# Patient Record
Sex: FEMALE | Race: WHITE | Employment: FULL TIME | URBAN - METROPOLITAN AREA
[De-identification: names, ages, dates, MRNs, and addresses within clinical notes are randomized per-mention and may not be internally consistent; named-entity substitution may affect disease eponyms.]

---

## 2024-01-22 ENCOUNTER — OFFICE VISIT (OUTPATIENT)
Dept: OBGYN CLINIC | Facility: CLINIC | Age: 34
End: 2024-01-22
Payer: COMMERCIAL

## 2024-01-22 ENCOUNTER — LAB ENCOUNTER (OUTPATIENT)
Dept: LAB | Age: 34
End: 2024-01-22
Payer: COMMERCIAL

## 2024-01-22 VITALS
BODY MASS INDEX: 29.41 KG/M2 | HEIGHT: 66 IN | DIASTOLIC BLOOD PRESSURE: 84 MMHG | HEART RATE: 71 BPM | SYSTOLIC BLOOD PRESSURE: 120 MMHG | WEIGHT: 183 LBS

## 2024-01-22 DIAGNOSIS — N92.6 IRREGULAR BLEEDING: ICD-10-CM

## 2024-01-22 DIAGNOSIS — Z31.41 FERTILITY TESTING: ICD-10-CM

## 2024-01-22 DIAGNOSIS — Z01.419 WELL WOMAN EXAM WITH ROUTINE GYNECOLOGICAL EXAM: ICD-10-CM

## 2024-01-22 DIAGNOSIS — Z01.419 WELL WOMAN EXAM WITH ROUTINE GYNECOLOGICAL EXAM: Primary | ICD-10-CM

## 2024-01-22 LAB
FSH SERPL-ACNC: 3.6 MIU/ML
LH SERPL-ACNC: 2.9 MIU/ML
PROLACTIN SERPL-MCNC: 17.9 NG/ML
TSI SER-ACNC: 3.15 MIU/ML (ref 0.36–3.74)

## 2024-01-22 PROCEDURE — 84443 ASSAY THYROID STIM HORMONE: CPT

## 2024-01-22 PROCEDURE — 87624 HPV HI-RISK TYP POOLED RSLT: CPT

## 2024-01-22 PROCEDURE — 84146 ASSAY OF PROLACTIN: CPT

## 2024-01-22 PROCEDURE — 83002 ASSAY OF GONADOTROPIN (LH): CPT

## 2024-01-22 PROCEDURE — 83001 ASSAY OF GONADOTROPIN (FSH): CPT

## 2024-01-22 PROCEDURE — 88175 CYTOPATH C/V AUTO FLUID REDO: CPT

## 2024-01-22 PROCEDURE — 83520 IMMUNOASSAY QUANT NOS NONAB: CPT

## 2024-01-22 RX ORDER — VENLAFAXINE HYDROCHLORIDE 37.5 MG/1
37.5 CAPSULE, EXTENDED RELEASE ORAL
COMMUNITY
Start: 2023-12-26

## 2024-01-22 NOTE — PROGRESS NOTES
Cady Garcia is a 33 year old female  Patient's last menstrual period was 2023 (exact date).   Chief Complaint   Patient presents with    New Patient    Wellness Visit     Needs pap smear    Consult     Preconception, has been trying to conceive for 6mo and not getting pregnant.  had semen analysis 10yrs ago, it was WNL but slow swimmers. Has been taking a conception for men supplement    .    OBSTETRICS HISTORY:  OB History    Para Term  AB Living   0 0 0 0 0 0   SAB IAB Ectopic Multiple Live Births   0 0 0 0 0       GYNE HISTORY:  Periods regular monthly  her cycle is every 28-29 days, bleeds for 6 days.   History   Sexual Activity    Sexual activity: Yes    Partners: Male     Comment: trying to conceive        Hx Prior Abnormal Pap: No  Pap Date:  (3-4yrs)  Pap Result Notes: WNL per patient        MEDICAL HISTORY:  History reviewed. No pertinent past medical history.    SURGICAL HISTORY:  Past Surgical History:   Procedure Laterality Date    Gastric bypass,obese<100cm nicole-en-y         SOCIAL HISTORY:  Social History     Socioeconomic History    Marital status:      Spouse name: Not on file    Number of children: Not on file    Years of education: Not on file    Highest education level: Not on file   Occupational History    Not on file   Tobacco Use    Smoking status: Never    Smokeless tobacco: Never   Vaping Use    Vaping Use: Never used   Substance and Sexual Activity    Alcohol use: Yes     Comment: rarely    Drug use: Never    Sexual activity: Yes     Partners: Male     Comment: trying to conceive   Other Topics Concern    Not on file   Social History Narrative    Not on file     Social Determinants of Health     Financial Resource Strain: Not on file   Food Insecurity: Not on file   Transportation Needs: Not on file   Physical Activity: Not on file   Stress: Not on file   Social Connections: Not on file   Housing Stability: Not on file       FAMILY  HISTORY:  Family History   Problem Relation Age of Onset    Kidney Disease Father         kidney failure    Hypertension Mother        MEDICATIONS:    Current Outpatient Medications:     venlafaxine ER 37.5 MG Oral Capsule SR 24 Hr, Take 1 capsule (37.5 mg total) by mouth daily with food., Disp: , Rfl:     ALLERGIES:  No Known Allergies      Review of Systems:  Constitutional:  Denies fatigue, night sweats, hot flashes  Eyes:  denies blurred or double vision  Cardiovascular:  denies chest pain or palpitations  Respiratory:  denies shortness of breath  Gastrointestinal:  denies heartburn, abdominal pain, diarrhea or constipation  Genitourinary:  denies dysuria, incontinence, abnormal vaginal discharge, vaginal itching  Musculoskeletal:  denies back pain.  Skin/Breast:  Denies any breast pain, lumps, or discharge.   Neurological:  denies headaches, extremity weakness or numbness.  Psychiatric: denies depression or anxiety.  Endocrine:   denies excessive thirst or urination.  Heme/Lymph:  denies history of anemia, easy bruising or bleeding.      PHYSICAL EXAM:   Constitutional: well developed, well nourished  Head/Face: normocephalic  Neck/Thyroid: thyroid symmetric, no thyromegaly, no nodules, no adenopathy  Lymphatic:no abnormal supraclavicular or axillary adenopathy is noted  Breast: normal without palpable masses, tenderness, asymmetry, nipple discharge, nipple retraction or skin changes  Abdomen:  soft, nontender, nondistended, no masses  Skin/Hair: no unusual rashes or bruises  Extremities: no edema, no cyanosis  Psychiatric:  Oriented to time, place, person and situation. Appropriate mood and affect    Pelvic Exam:  External Genitalia: normal appearance, hair distribution, and no lesions  Urethral Meatus:  normal in size, location, without lesions and prolapse  Bladder:  No fullness, masses or tenderness  Vagina:  Normal appearance without lesions, no abnormal discharge  Cervix:  Normal without tenderness on  motion, cervical polyp noted   Uterus: normal in size, contour, position, mobility, without tenderness  Adnexa: normal without masses or tenderness  Perineum: normal  Anus: no hemorroids     Assessment & Plan:  Diagnoses and all orders for this visit:    Well woman exam with routine gynecological exam  -     Hpv High Risk , Thin Prep Collect; Future  -     ThinPrep PAP Smear B; Future    Fertility testing  -     FSH; Future  -     LH (Luteinizing Hormone); Future  -     TSH W REFLEX TO FREE T4 [60246][Q]; Future  -     Anti-Mullerian Hormone; Future  -     Prolactin; Future  -     XR HYSTEROSALPINGOGRAM (CPT=74740/42832); Future    Irregular bleeding    Discussed w/pt cervical polyp causing her irregular spotting - advise to see OBs for removal.

## 2024-01-23 ENCOUNTER — PATIENT MESSAGE (OUTPATIENT)
Dept: OBGYN CLINIC | Facility: CLINIC | Age: 34
End: 2024-01-23

## 2024-01-23 DIAGNOSIS — Z01.812 PRE-PROCEDURAL LABORATORY EXAMINATION: Primary | ICD-10-CM

## 2024-01-23 LAB — HPV I/H RISK 1 DNA SPEC QL NAA+PROBE: NEGATIVE

## 2024-01-25 NOTE — TELEPHONE ENCOUNTER
From: Cady Garcia  To: Sarita Krishnan  Sent: 1/23/2024 12:52 PM CST  Subject: Test results     Do I need to make an appointment with the fertility doctor that you gave me? Or should I wait for results of my HSG and my ’s semen analysis?

## 2024-01-25 NOTE — TELEPHONE ENCOUNTER
Pt calling to speak to the nurse about this. Pt states she is getting confused with all the [facility] messages.

## 2024-01-26 LAB — MULLERIAN AMH: 3.4 NG/ML

## 2024-01-29 LAB
.: NORMAL
.: NORMAL

## 2024-01-31 ENCOUNTER — HOSPITAL ENCOUNTER (OUTPATIENT)
Dept: GENERAL RADIOLOGY | Facility: HOSPITAL | Age: 34
Discharge: HOME | End: 2024-01-31
Payer: COMMERCIAL

## 2024-01-31 DIAGNOSIS — Z31.41 FERTILITY TESTING: ICD-10-CM

## 2024-01-31 PROCEDURE — 58340 CATHETER FOR HYSTEROGRAPHY: CPT

## 2024-01-31 PROCEDURE — 74740 X-RAY FEMALE GENITAL TRACT: CPT

## 2024-02-01 ENCOUNTER — OFFICE VISIT (OUTPATIENT)
Facility: CLINIC | Age: 34
End: 2024-02-01
Payer: COMMERCIAL

## 2024-02-01 VITALS
HEIGHT: 66 IN | SYSTOLIC BLOOD PRESSURE: 118 MMHG | BODY MASS INDEX: 29.41 KG/M2 | DIASTOLIC BLOOD PRESSURE: 80 MMHG | HEART RATE: 70 BPM | WEIGHT: 183 LBS

## 2024-02-01 DIAGNOSIS — Z11.3 SCREENING EXAMINATION FOR STI: Primary | ICD-10-CM

## 2024-02-01 PROCEDURE — 3074F SYST BP LT 130 MM HG: CPT

## 2024-02-01 PROCEDURE — 99212 OFFICE O/P EST SF 10 MIN: CPT

## 2024-02-01 PROCEDURE — 3079F DIAST BP 80-89 MM HG: CPT

## 2024-02-01 PROCEDURE — 87591 N.GONORRHOEAE DNA AMP PROB: CPT

## 2024-02-01 PROCEDURE — 3008F BODY MASS INDEX DOCD: CPT

## 2024-02-01 PROCEDURE — 87491 CHLMYD TRACH DNA AMP PROBE: CPT

## 2024-02-01 RX ORDER — MULTIVITAMIN
1 TABLET ORAL DAILY
COMMUNITY

## 2024-02-01 NOTE — PROGRESS NOTES
Cady Garcia is a 33 year old female  Patient's last menstrual period was 2024 (exact date).   Chief Complaint   Patient presents with    Gyn Problem   .  She is here for a gonorrhea/chlamydia cervical culture, plans of meeting with reproductive endocrinologist.     OBSTETRICS HISTORY:  OB History    Para Term  AB Living   0 0 0 0 0 0   SAB IAB Ectopic Multiple Live Births   0 0 0 0 0       GYNE HISTORY:      History   Sexual Activity    Sexual activity: Yes    Partners: Male     Comment: trying to conceive        Pap Date: 24  Pap Result Notes: neg/neg        MEDICAL HISTORY:  History reviewed. No pertinent past medical history.    SURGICAL HISTORY:  Past Surgical History:   Procedure Laterality Date    Gastric bypass,obese<100cm nicole-en-y         SOCIAL HISTORY:  Social History     Socioeconomic History    Marital status:      Spouse name: Not on file    Number of children: Not on file    Years of education: Not on file    Highest education level: Not on file   Occupational History    Not on file   Tobacco Use    Smoking status: Never    Smokeless tobacco: Never   Vaping Use    Vaping Use: Never used   Substance and Sexual Activity    Alcohol use: Not Currently     Comment: rarely    Drug use: Never    Sexual activity: Yes     Partners: Male     Comment: trying to conceive   Other Topics Concern    Not on file   Social History Narrative    ** Merged History Encounter **          Social Determinants of Health     Financial Resource Strain: Not on file   Food Insecurity: Not on file   Transportation Needs: Not on file   Physical Activity: Not on file   Stress: Not on file   Social Connections: Not on file   Housing Stability: Not on file       FAMILY HISTORY:  Family History   Problem Relation Age of Onset    Kidney Disease Father         kidney failure    Hypertension Mother        MEDICATIONS:    Current Outpatient Medications:     Multiple Vitamin (ONE-DAILY  MULTI VITAMINS) Oral Tab, Take 1 tablet by mouth daily., Disp: , Rfl:     venlafaxine ER 37.5 MG Oral Capsule SR 24 Hr, Take 1 capsule (37.5 mg total) by mouth daily with food., Disp: , Rfl:     ALLERGIES:    Allergies   Allergen Reactions    Amoxicillin HIVES    Sulfa Antibiotics HIVES         PHYSICAL EXAM:   Pelvic Exam:  External Genitalia: normal appearance, hair distribution, and no lesions  Urethral Meatus:  normal in size, location, without lesions and prolapse  Bladder:  No fullness, masses or tenderness  Vagina:  Normal appearance without lesions, no abnormal discharge  Cervix:  Normal without tenderness on motion, cervical polyp seen   Perineum: normal  Anus: no hemorroids     Assessment & Plan:  1. Screening examination for STI    - Chlamydia/Gc Amplification; Future

## 2024-02-02 LAB
C TRACH DNA SPEC QL NAA+PROBE: NEGATIVE
N GONORRHOEA DNA SPEC QL NAA+PROBE: NEGATIVE

## 2024-02-05 ENCOUNTER — TELEPHONE (OUTPATIENT)
Facility: CLINIC | Age: 34
End: 2024-02-05

## 2024-02-05 DIAGNOSIS — N97.9 FEMALE INFERTILITY: Primary | ICD-10-CM

## 2024-02-05 NOTE — TELEPHONE ENCOUNTER
1.Pt looking for her partner's semen analysis report:Grey Garcia [birth year]. Will call [facility] for results.     2. Once received with send to provider for review    3. And we can place AYESHA request    Patient verbalized understanding, agreed to and intend to comply with plan of care.

## 2024-02-05 NOTE — TELEPHONE ENCOUNTER
Patient is calling for  her husbands semen analysis report. Her and her  can she them in my chart but have some questions

## 2024-02-06 NOTE — TELEPHONE ENCOUNTER
AYESHA referral initiated, awaiting determination, pt aware, verb. Understanding.   Referral Information    Referral # Creation Date Referral Status Status Update    [id number] 02/06/2024 Open 02/06/2024: Status History     Status Reason Referral Type Referral Reasons Referral Class   none OFFICE VISIT none Internal     To Specialty To Provider To Location/Place of Service To Department   INFERTILITY REPRODUCTIVE ENDO Bear Kay MD none none

## 2024-02-06 NOTE — PROGRESS NOTES
Partner's Semen Analysis result:  Name:Grey Garcia   : 3/23/1985    Normal spermatozoa 11%  Spermatozoa with%:      Head defects 76%      Mid piece defects 13%      Tail defects 0%

## 2024-02-08 ENCOUNTER — TELEPHONE (OUTPATIENT)
Dept: ADMINISTRATIVE | Age: 34
End: 2024-02-08

## 2024-02-08 ENCOUNTER — TELEPHONE (OUTPATIENT)
Facility: CLINIC | Age: 34
End: 2024-02-08

## 2024-02-13 ENCOUNTER — OFFICE VISIT (OUTPATIENT)
Dept: FAMILY MEDICINE CLINIC | Facility: CLINIC | Age: 34
End: 2024-02-13
Payer: COMMERCIAL

## 2024-02-13 VITALS
HEIGHT: 66 IN | RESPIRATION RATE: 16 BRPM | BODY MASS INDEX: 29.57 KG/M2 | TEMPERATURE: 98 F | WEIGHT: 184 LBS | HEART RATE: 91 BPM | SYSTOLIC BLOOD PRESSURE: 104 MMHG | OXYGEN SATURATION: 97 % | DIASTOLIC BLOOD PRESSURE: 64 MMHG

## 2024-02-13 DIAGNOSIS — F41.1 GAD (GENERALIZED ANXIETY DISORDER): ICD-10-CM

## 2024-02-13 DIAGNOSIS — Z83.3 FAMILY HISTORY OF DIABETES MELLITUS: ICD-10-CM

## 2024-02-13 DIAGNOSIS — Z00.00 WELL ADULT EXAM: Primary | ICD-10-CM

## 2024-02-13 DIAGNOSIS — E04.2 MULTIPLE THYROID NODULES: ICD-10-CM

## 2024-02-13 DIAGNOSIS — E78.00 PURE HYPERCHOLESTEROLEMIA: ICD-10-CM

## 2024-02-13 DIAGNOSIS — Z91.09 ENVIRONMENTAL ALLERGIES: ICD-10-CM

## 2024-02-13 PROBLEM — E03.9 HYPOTHYROIDISM: Status: ACTIVE | Noted: 2024-02-13

## 2024-02-13 PROCEDURE — 3008F BODY MASS INDEX DOCD: CPT | Performed by: FAMILY MEDICINE

## 2024-02-13 PROCEDURE — 3074F SYST BP LT 130 MM HG: CPT | Performed by: FAMILY MEDICINE

## 2024-02-13 PROCEDURE — 3078F DIAST BP <80 MM HG: CPT | Performed by: FAMILY MEDICINE

## 2024-02-13 PROCEDURE — 99385 PREV VISIT NEW AGE 18-39: CPT | Performed by: FAMILY MEDICINE

## 2024-02-13 PROCEDURE — G0438 PPPS, INITIAL VISIT: HCPCS | Performed by: FAMILY MEDICINE

## 2024-02-13 RX ORDER — VENLAFAXINE HYDROCHLORIDE 37.5 MG/1
37.5 CAPSULE, EXTENDED RELEASE ORAL
Qty: 90 CAPSULE | Refills: 3 | Status: SHIPPED | OUTPATIENT
Start: 2024-02-13

## 2024-02-13 RX ORDER — ALPRAZOLAM 0.25 MG/1
0.25 TABLET ORAL NIGHTLY PRN
COMMUNITY

## 2024-02-13 RX ORDER — MONTELUKAST SODIUM 10 MG/1
10 TABLET ORAL DAILY
Qty: 90 TABLET | Refills: 3 | Status: SHIPPED | OUTPATIENT
Start: 2024-02-13 | End: 2025-02-07

## 2024-02-13 NOTE — PROGRESS NOTES
Note to patient: The 21st Century Cures Act makes medical notes like these available to patients in the interest of transparency. However, be advised this is a medical document. It is intended as peer to peer communication. It is written in medical language and may contain abbreviations or verbiage that are unfamiliar. It may appear blunt or direct. Medical documents are intended to carry relevant information, facts as evident, and the clinical opinion of the practitioner.         Chief Complaint   Patient presents with    Physical       HPI:  New patient presents for annual physical. Her past medical history includes anxiety, hyperlipidemia, hypothyroidism, and allergic rhinitis. Current medications include Venlafaxine (requesting refill) and an OTC allergy medication. She states for the last 5 months the roof of her mouth and posterior pharynx has been intermittently itching throughout the day.    Surgical history includes gastric bypass in 2022 and breat augmentation b/l in 2017.  Patient reports her and her  have been thinking about having kids soon.     Patient reports increase in allergies since moving to [place]. She reports that she has postnasal drip/itchy pharynx and sinus headaches. She is taking otc anti allergy medication.     PMHx: Anxiety, hyperlipidemia, hypothyroidism, allergic rhinitis   Multiple nodules on neck ultrasound, benign, has appointment in July   PSHx: Gastric bypass 2022- sh eis on MV, breast augmentation b/l 2017  FMHx:  -maternal: Mother- hypertension, hyperlipidemia   -paternal: Father - kidney disease, hypotension, prostate cancer; passed away from kidney failure failure 2 years   Smoking: none  Alcohol: occasionally   Drugs: none   Sexual hx: 1 partner   STD hx: declined  Occupation: Xray technologist   Mammogram: Never   Colonoscopy: Never   Maternal uncle with colon cancer  Pap smear: Sees OB/GYN, had pap smear done 2/1/24- no hx of abnormal pap smears  Tdap: 2015   Diet:  Reports well diet,  is a - eats home cooked meals   Exercise: Needs improvement, states activity is better in the summer     Wt Readings from Last 6 Encounters:   02/13/24 184 lb   02/01/24 183 lb   01/22/24 182 lb 15.7 oz   01/31/22 210 lb   09/02/21 209 lb     Review of Systems   Constitutional: Negative for fever, chills and fatigue. No distress.  HENT: Negative for hearing loss, congestion, sore throat, neck pain +post nasal drip +roof of mouth itching   Eyes: Negative for pain and visual disturbance.   Respiratory: Negative for  chest tightness, shortness of breath and wheezing.    Cardiovascular: Negative for chest pain, palpitations and leg swelling.   Gastrointestinal: Negative for nausea, vomiting, abdominal pain, diarrhea, blood in stool and abdominal distention.   Genitourinary: Negative for dysuria, hematuria and difficulty urinating.   Musculoskeletal: Negative for myalgias, back pain, joint swelling, arthralgias and gait problem.   Skin: Negative for color change and rash.   Neurological: Negative for dizziness, syncope, weakness, numbness, tingling and headaches.   Hematological: Negative for adenopathy. Does not bruise/bleed easily.   Psychiatric/Behavioral: The patient is not nervous/anxious. No depression.      Past Medical History:   Diagnosis Date    Allergic rhinitis     Anxiety 2014    taking Venlafaxine 37.5mg    Hyperlipidemia 2021    Hypothyroidism nodules on thyroid in 2023. need follow up in June 2024.    Obesity baratric surg 2022     Past Surgical History:   Procedure Laterality Date    GASTRIC BYPASS,OBESE<100CM DIANA-EN-Y  2022    OTHER SURGICAL HISTORY  gastric bypass and breast augmentation     Family History   Problem Relation Age of Onset    Kidney Disease Father         kidney failure    Cancer Father         prostate CA    Diabetes Father     Heart Disorder Father         a-fib, and pacemaker    Hypertension Mother     Lipids Mother         high cholesterol     Social  History     Socioeconomic History    Marital status:    Tobacco Use    Smoking status: Never    Smokeless tobacco: Never   Vaping Use    Vaping Use: Never used   Substance and Sexual Activity    Alcohol use: Yes     Alcohol/week: 1.0 standard drink of alcohol     Types: 1 Standard drinks or equivalent per week     Comment: rarely    Drug use: Never    Sexual activity: Yes     Partners: Male     Comment: trying to conceive   Other Topics Concern    Caffeine Concern No    Exercise No    Seat Belt No    Special Diet No    Stress Concern No    Weight Concern No   Social History Narrative    ** Merged History Encounter **            Current Outpatient Medications   Medication Sig Dispense Refill    ALPRAZolam 0.25 MG Oral Tab Take 1 tablet (0.25 mg total) by mouth nightly as needed.      Multiple Vitamin (ONE-DAILY MULTI VITAMINS) Oral Tab Take 1 tablet by mouth daily.      venlafaxine ER 37.5 MG Oral Capsule SR 24 Hr Take 1 capsule (37.5 mg total) by mouth daily with food.         Allergies  Allergies   Allergen Reactions    Amoxicillin HIVES    Sulfa Antibiotics HIVES       Health Maintenance  Immunizations:  Immunization History   Administered Date(s) Administered    Covid-19 Vaccine Pfizer 30 mcg/0.3 ml 01/21/2021, 02/10/2021, 11/11/2021    FLUZONE 6 months and older PFS 0.5 ml (69605) 09/25/2017    HEP B, Adult 03/09/1993, 05/14/1993, 10/08/1993    Influenza 02/10/2020, 10/12/2021, 10/15/2023    MMR 03/10/1994, 06/18/1998    TDAP 07/06/2015    Tb Intradermal Test 07/02/2018         Physical Exam  /64   Pulse 91   Temp 98 °F (36.7 °C) (Temporal)   Resp 16   Ht 5' 6\" (1.676 m)   Wt 184 lb   LMP 01/23/2024 (Exact Date)   SpO2 97%   BMI 29.70 kg/m²   Constitutional: Oriented to person, place, and time. No distress.   HEENT: Normocephalic and atraumatic. Hearing and tympanic membranes normal.  Nose normal. Oropharynx is clear and moist.   Eyes: Conjunctivae and EOM are normal. PERRLA. No scleral  icterus.   Neck: No mass and no thyromegaly present.   Cardiovascular: Normal rate, regular rhythm and intact distal pulses.  No murmur, rubs or gallops.   Pulmonary/Chest: Effort normal and breath sounds normal. No respiratory distress. No wheezes, rhonchi or rales  Abdominal: Soft. Bowel sounds are normal. Non tender, no masses, no organomegaly or hernias.  Musculoskeletal: No edema and no tenderness.    Neurological: grossly normal   Skin: Skin is warm and dry.  Psychiatric: Normal mood and affect.     A/P:    Encounter Diagnosis   Name Primary?    Well adult exam Yes         Meds & Refills for this Visit:  Requested Prescriptions     Pending Prescriptions Disp Refills    venlafaxine ER 37.5 MG Oral Capsule SR 24 Hr 90 capsule 3     Sig: Take 1 capsule (37.5 mg total) by mouth daily with food.       1. Well adult exam  - CBC With Differential With Platelet; Future  - Comp Metabolic Panel (14); Future  - Lipid Panel; Future    2. Family history of diabetes mellitus  - Hemoglobin A1C [E]; Future    3. CHON (generalized anxiety disorder)    4. Pure hypercholesterolemia    5. Multiple thyroid nodules  - Endocrine Referral - In Network    6. Environmental allergies  - montelukast (SINGULAIR) 10 MG Oral Tab; Take 1 tablet (10 mg total) by mouth daily.  Dispense: 90 tablet; Refill: 3    New patient presented for physical. Basic blood work and A1c was ordered due to fmhx of DM. Patient reported worsening allergies, montelukast was prescribed and advised patient to use flonase as needed. Patient was provided a referral for endocrinologist due to history of multiple benign thyroid nodules on neck ultrasound, TSH 1/2024 was WNL. No major concerns at this time. A refill for her Venlafaxine was prescribed for CHON. Patient to follow up for next annual examination or sooner if needed. Patient understands and agrees with plan.     Jojo LAWSON-S2      I have personally seen and examined the patient, I have reviewed the PA  student's examination, notes, and agree with the assessment and plan.      Lucía Chun, DO        This note was prepared using Dragon Medical voice recognition dictation software. As a result errors may occur. When identified these errors have been corrected. While every attempt is made to correct errors during dictation discrepancies may still exist.      Note to patient: The 21st Century Cures Act makes medical notes like these available to patients in the interest of transparency. However, be advised this is a medical document. It is intended as peer to peer communication. It is written in medical language and may contain abbreviations or verbiage that are unfamiliar. It may appear blunt or direct. Medical documents are intended to carry relevant information, facts as evident, and the clinical opinion of the practitioner.

## 2024-02-16 ENCOUNTER — TELEPHONE (OUTPATIENT)
Facility: CLINIC | Age: 34
End: 2024-02-16

## 2024-02-16 NOTE — TELEPHONE ENCOUNTER
Pt states her ins approved the referral and faxed it to us, we do not have the fax. Pt provided the phone number from her ins, [phone]  Also I advised the pt to call the referral dept of her ins and give them our fax number.

## 2024-02-19 NOTE — TELEPHONE ENCOUNTER
Spoke with pt. Asked if I could refax referral & records to Dr. Kay. Records faxed. Verbalized understanding.

## 2024-02-21 ENCOUNTER — PATIENT MESSAGE (OUTPATIENT)
Facility: CLINIC | Age: 34
End: 2024-02-21

## 2024-02-22 NOTE — TELEPHONE ENCOUNTER
From: Cady Garcia  To: Sarita Krishnan  Sent: 2/21/2024 4:51 PM CST  Subject: Fax results     Hello! My fertility doctor would like to see my HSG X-rays. Is there a way to send those over? They are also requesting my lab results and husbands semen analysis results so they can see numbers.     For Dr Kay’ clinic

## 2024-02-22 NOTE — TELEPHONE ENCOUNTER
Spoke with patient. She is aware to call medical records for a CD of her Hysterosalpingogram done at the hospital. She should be able to get her labs there as well. She will need to call [facility] for physical copy of spouses semen analysis. Our copy was sent to scanning. Understanding verbalized.

## 2024-03-05 ENCOUNTER — PATIENT MESSAGE (OUTPATIENT)
Dept: FAMILY MEDICINE CLINIC | Facility: CLINIC | Age: 34
End: 2024-03-05

## 2024-03-06 ENCOUNTER — LAB ENCOUNTER (OUTPATIENT)
Dept: LAB | Age: 34
End: 2024-03-06
Attending: FAMILY MEDICINE
Payer: COMMERCIAL

## 2024-03-06 DIAGNOSIS — Z83.3 FAMILY HISTORY OF DIABETES MELLITUS: ICD-10-CM

## 2024-03-06 DIAGNOSIS — Z00.00 WELL ADULT EXAM: ICD-10-CM

## 2024-03-06 LAB
ALBUMIN SERPL-MCNC: 3.8 G/DL (ref 3.4–5)
ALBUMIN/GLOB SERPL: 1.2 {RATIO} (ref 1–2)
ALP LIVER SERPL-CCNC: 73 U/L
ALT SERPL-CCNC: 44 U/L
ANION GAP SERPL CALC-SCNC: 1 MMOL/L (ref 0–18)
AST SERPL-CCNC: 22 U/L (ref 15–37)
BASOPHILS # BLD AUTO: 0.02 X10(3) UL (ref 0–0.2)
BASOPHILS NFR BLD AUTO: 0.3 %
BILIRUB SERPL-MCNC: 0.4 MG/DL (ref 0.1–2)
BUN BLD-MCNC: 13 MG/DL (ref 9–23)
CALCIUM BLD-MCNC: 8.9 MG/DL (ref 8.5–10.1)
CHLORIDE SERPL-SCNC: 105 MMOL/L (ref 98–112)
CHOLEST SERPL-MCNC: 202 MG/DL (ref ?–200)
CO2 SERPL-SCNC: 30 MMOL/L (ref 21–32)
CREAT BLD-MCNC: 0.7 MG/DL
EGFRCR SERPLBLD CKD-EPI 2021: 119 ML/MIN/1.73M2 (ref 60–?)
EOSINOPHIL # BLD AUTO: 0.03 X10(3) UL (ref 0–0.7)
EOSINOPHIL NFR BLD AUTO: 0.5 %
ERYTHROCYTE [DISTWIDTH] IN BLOOD BY AUTOMATED COUNT: 12 %
EST. AVERAGE GLUCOSE BLD GHB EST-MCNC: 108 MG/DL (ref 68–126)
FASTING PATIENT LIPID ANSWER: YES
FASTING STATUS PATIENT QL REPORTED: YES
GLOBULIN PLAS-MCNC: 3.1 G/DL (ref 2.8–4.4)
GLUCOSE BLD-MCNC: 88 MG/DL (ref 70–99)
HBA1C MFR BLD: 5.4 % (ref ?–5.7)
HCT VFR BLD AUTO: 37.3 %
HDLC SERPL-MCNC: 68 MG/DL (ref 40–59)
HGB BLD-MCNC: 12.4 G/DL
IMM GRANULOCYTES # BLD AUTO: 0.01 X10(3) UL (ref 0–1)
IMM GRANULOCYTES NFR BLD: 0.2 %
LDLC SERPL CALC-MCNC: 120 MG/DL (ref ?–100)
LYMPHOCYTES # BLD AUTO: 2.98 X10(3) UL (ref 1–4)
LYMPHOCYTES NFR BLD AUTO: 45.3 %
MCH RBC QN AUTO: 30.8 PG (ref 26–34)
MCHC RBC AUTO-ENTMCNC: 33.2 G/DL (ref 31–37)
MCV RBC AUTO: 92.6 FL
MONOCYTES # BLD AUTO: 0.44 X10(3) UL (ref 0.1–1)
MONOCYTES NFR BLD AUTO: 6.7 %
NEUTROPHILS # BLD AUTO: 3.1 X10 (3) UL (ref 1.5–7.7)
NEUTROPHILS # BLD AUTO: 3.1 X10(3) UL (ref 1.5–7.7)
NEUTROPHILS NFR BLD AUTO: 47 %
NONHDLC SERPL-MCNC: 134 MG/DL (ref ?–130)
OSMOLALITY SERPL CALC.SUM OF ELEC: 282 MOSM/KG (ref 275–295)
PLATELET # BLD AUTO: 194 10(3)UL (ref 150–450)
POTASSIUM SERPL-SCNC: 4.1 MMOL/L (ref 3.5–5.1)
PROT SERPL-MCNC: 6.9 G/DL (ref 6.4–8.2)
RBC # BLD AUTO: 4.03 X10(6)UL
SODIUM SERPL-SCNC: 136 MMOL/L (ref 136–145)
TRIGL SERPL-MCNC: 80 MG/DL (ref 30–149)
VLDLC SERPL CALC-MCNC: 14 MG/DL (ref 0–30)
WBC # BLD AUTO: 6.6 X10(3) UL (ref 4–11)

## 2024-03-06 PROCEDURE — 80053 COMPREHEN METABOLIC PANEL: CPT

## 2024-03-06 PROCEDURE — 83036 HEMOGLOBIN GLYCOSYLATED A1C: CPT

## 2024-03-06 PROCEDURE — 80061 LIPID PANEL: CPT

## 2024-03-06 PROCEDURE — 85025 COMPLETE CBC W/AUTO DIFF WBC: CPT

## 2024-03-06 PROCEDURE — 36415 COLL VENOUS BLD VENIPUNCTURE: CPT

## 2024-03-06 NOTE — TELEPHONE ENCOUNTER
From: Cady Garcia  To: Ladanalokyordy Chun  Sent: 3/5/2024 9:30 PM CST  Subject: ADHD question     Hello! I probably should have mentioned this question when I saw you last time. But I feel like I may have some symptoms of ADHD that I would like to talk to someone about. I have had anxiety that I was diagnosed with many years ago and I know it can go hand and hand. Would it be something that I can talk with you about or do I need to go see someone else?

## 2024-03-26 ENCOUNTER — OFFICE VISIT (OUTPATIENT)
Facility: CLINIC | Age: 34
End: 2024-03-26
Payer: COMMERCIAL

## 2024-03-26 VITALS
HEIGHT: 66 IN | WEIGHT: 186 LBS | DIASTOLIC BLOOD PRESSURE: 80 MMHG | BODY MASS INDEX: 29.89 KG/M2 | SYSTOLIC BLOOD PRESSURE: 108 MMHG

## 2024-03-26 DIAGNOSIS — Z32.02 PREGNANCY EXAMINATION OR TEST, NEGATIVE RESULT: Primary | ICD-10-CM

## 2024-03-26 DIAGNOSIS — N84.1 CERVICAL POLYP: ICD-10-CM

## 2024-03-26 PROBLEM — N93.0 PCB (POST COITAL BLEEDING): Status: ACTIVE | Noted: 2024-03-26

## 2024-03-26 LAB
CONTROL LINE PRESENT WITH A CLEAR BACKGROUND (YES/NO): YES YES/NO
KIT LOT #: NORMAL NUMERIC
PREGNANCY TEST, URINE: NEGATIVE

## 2024-03-26 PROCEDURE — 3074F SYST BP LT 130 MM HG: CPT | Performed by: OBSTETRICS & GYNECOLOGY

## 2024-03-26 PROCEDURE — 88341 IMHCHEM/IMCYTCHM EA ADD ANTB: CPT | Performed by: OBSTETRICS & GYNECOLOGY

## 2024-03-26 PROCEDURE — 88305 TISSUE EXAM BY PATHOLOGIST: CPT | Performed by: OBSTETRICS & GYNECOLOGY

## 2024-03-26 PROCEDURE — 88342 IMHCHEM/IMCYTCHM 1ST ANTB: CPT | Performed by: OBSTETRICS & GYNECOLOGY

## 2024-03-26 PROCEDURE — 3008F BODY MASS INDEX DOCD: CPT | Performed by: OBSTETRICS & GYNECOLOGY

## 2024-03-26 PROCEDURE — 81025 URINE PREGNANCY TEST: CPT | Performed by: OBSTETRICS & GYNECOLOGY

## 2024-03-26 PROCEDURE — 57500 BIOPSY OF CERVIX: CPT | Performed by: OBSTETRICS & GYNECOLOGY

## 2024-03-26 PROCEDURE — 3079F DIAST BP 80-89 MM HG: CPT | Performed by: OBSTETRICS & GYNECOLOGY

## 2024-03-26 NOTE — PROGRESS NOTES
Gynecology Office Visit      Cady Garcia is a 33 year old female  Patient's last menstrual period was 2024 (exact date). (contraception:  none)     HPI:     Chief Complaint   Patient presents with    Procedure     Pt here for polyp removal consent obtained       Pt with post coital bleeding. Was seen by NP last month, found to have cervical polyp. Here for removal.      Chart and previous encounters reviewed.  HISTORY:  Past Medical History:   Diagnosis Date    Allergic rhinitis     Anxiety     taking Venlafaxine 37.5mg    Hyperlipidemia     Hypothyroidism 2024    Obesity baratric surg       Past Surgical History:   Procedure Laterality Date    GASTRIC BYPASS,OBESE<100CM DIANA-EN-Y      OTHER SURGICAL HISTORY  gastric bypass and breast augmentation      Family History   Problem Relation Age of Onset    Kidney Disease Father         kidney failure    Cancer Father         prostate CA    Diabetes Father     Heart Disorder Father         a-fib, and pacemaker    Hypertension Mother     Lipids Mother         high cholesterol      Social History:   Social History     Socioeconomic History    Marital status:    Tobacco Use    Smoking status: Never    Smokeless tobacco: Never   Vaping Use    Vaping Use: Never used   Substance and Sexual Activity    Alcohol use: Yes     Alcohol/week: 1.0 standard drink of alcohol     Types: 1 Standard drinks or equivalent per week     Comment: rarely    Drug use: Never    Sexual activity: Yes     Partners: Male     Comment: trying to conceive   Other Topics Concern    Caffeine Concern No    Exercise No    Seat Belt No    Special Diet No    Stress Concern No    Weight Concern No   Social History Narrative    ** Merged History Encounter **             Medications (Active prior to today's visit):  Current Outpatient Medications   Medication Sig Dispense Refill    ALPRAZolam 0.25 MG Oral Tab Take 1 tablet (0.25 mg total) by mouth nightly as  needed.      venlafaxine ER 37.5 MG Oral Capsule SR 24 Hr Take 1 capsule (37.5 mg total) by mouth daily with food. 90 capsule 3    montelukast (SINGULAIR) 10 MG Oral Tab Take 1 tablet (10 mg total) by mouth daily. 90 tablet 3    Multiple Vitamin (ONE-DAILY MULTI VITAMINS) Oral Tab Take 1 tablet by mouth daily.         Allergies:  Allergies   Allergen Reactions    Amoxicillin HIVES    Sulfa Antibiotics HIVES       Gyn:  Menarche: 14  Period Cycle (Days): 28-29  Period Duration (Days): 6  Use of Birth Control (if yes, specify type): None  Pap Date: 24  Pap Result Notes: neg/neg  Follow Up Recommendation:     OB Hx:  OB History    Para Term  AB Living   0 0 0 0 0 0   SAB IAB Ectopic Multiple Live Births   0 0 0 0 0       PHYSICAL EXAM:   /80   Ht 66\"   Wt 186 lb (84.4 kg)   LMP 2024 (Exact Date)   BMI 30.02 kg/m²      Wt Readings from Last 6 Encounters:   24 186 lb (84.4 kg)   24 184 lb (83.5 kg)   24 183 lb (83 kg)   24 182 lb 15.7 oz (83 kg)   22 210 lb (95.3 kg)   21 209 lb (94.8 kg)        Gen:  Oriented, in no acute distress    Pelvic:      External Genitalia: Normal appearing, no lesions.  Vagina: normal pink mucosa, no lesions, normal clear discharge.    no anterior or posterior hernias.  Cervix: No CMT, 1cm cervical polyp noted    Sterile prep done, cervical polypectomy done without difficultty, to path.  Monsel's solution applied  Pt tolerated well.       ASSESSMENT/PLAN:     1. Pregnancy examination or test, negative result  - Urine Pregnancy Test    2. Cervical polyp  - Specimen to Pathology Tissue; Future  - Specimen to Pathology Tissue      Meds This Visit:  Requested Prescriptions      No prescriptions requested or ordered in this encounter       Imaging & Referrals:  None     No follow-ups on file.      Faheem Vee MD  3/26/2024  8:42 AM

## 2024-04-02 ENCOUNTER — MED REC SCAN ONLY (OUTPATIENT)
Facility: CLINIC | Age: 34
End: 2024-04-02

## 2024-05-22 ENCOUNTER — HOSPITAL ENCOUNTER (OUTPATIENT)
Dept: GENERAL RADIOLOGY | Age: 34
Discharge: HOME | End: 2024-05-22
Attending: FAMILY MEDICINE

## 2024-05-22 DIAGNOSIS — R22.41 KNEE MASS, RIGHT: ICD-10-CM

## 2024-05-22 PROCEDURE — 73562 X-RAY EXAM OF KNEE 3: CPT | Performed by: FAMILY MEDICINE

## 2024-06-05 ENCOUNTER — PATIENT MESSAGE (OUTPATIENT)
Dept: FAMILY MEDICINE CLINIC | Facility: CLINIC | Age: 34
End: 2024-06-05
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
## 2024-06-11 ENCOUNTER — OFFICE VISIT (OUTPATIENT)
Dept: FAMILY MEDICINE CLINIC | Facility: CLINIC | Age: 34
End: 2024-06-11
Payer: COMMERCIAL

## 2024-06-11 VITALS
SYSTOLIC BLOOD PRESSURE: 112 MMHG | DIASTOLIC BLOOD PRESSURE: 80 MMHG | RESPIRATION RATE: 16 BRPM | HEIGHT: 66 IN | BODY MASS INDEX: 30.05 KG/M2 | HEART RATE: 80 BPM | WEIGHT: 187 LBS

## 2024-06-11 DIAGNOSIS — R42 POSTURAL DIZZINESS WITH NEAR SYNCOPE: Primary | ICD-10-CM

## 2024-06-11 DIAGNOSIS — R55 POSTURAL DIZZINESS WITH NEAR SYNCOPE: Primary | ICD-10-CM

## 2024-06-11 DIAGNOSIS — R42 DIZZINESS: ICD-10-CM

## 2024-06-11 PROCEDURE — 3079F DIAST BP 80-89 MM HG: CPT | Performed by: FAMILY MEDICINE

## 2024-06-11 PROCEDURE — 3074F SYST BP LT 130 MM HG: CPT | Performed by: FAMILY MEDICINE

## 2024-06-11 PROCEDURE — 3008F BODY MASS INDEX DOCD: CPT | Performed by: FAMILY MEDICINE

## 2024-06-11 PROCEDURE — 99213 OFFICE O/P EST LOW 20 MIN: CPT | Performed by: FAMILY MEDICINE

## 2024-06-11 NOTE — PROGRESS NOTES
Subjective:   Cady Garcia is a 33 year old female who presents for Dizziness (Patient states she was biking for a long period of time.. felt faint. Patient is pregnant. Patient states her pulse was 190 and dropped quickly to 70 )       History/Other:    Chief Complaint Reviewed and Verified  Nursing Notes Reviewed and   Verified  Tobacco Reviewed  Allergies Reviewed  Medications Reviewed    Problem List Reviewed  Medical History Reviewed  Surgical History   Reviewed  Family History Reviewed  Social History Reviewed         Patient had her second IUI and had positive pregnancy test at home. She took 7 tests at home. She had her hcg drawn today. She is unde care of Dr. Kay.     Patient went for a bike ride 5 days ago. She did a long bike ride with several hills. She was very tired at that point and tried to ride up the last hill. By then she got out of breath. She got off the bike then back on it. She developed tunnel vision. She has hx of fainting in the past. She got off the bike and laid on her back. She also was panicking. Thinks she may have heard herself wheezing. Denies hx of childhood asthma. Reports that after her gastric bypass has noted that she gets more short of breath with exertion quickly. Denies coughing. No fmhx of asthma. No other prior hx of wheezing. Also reports that on her watch her HR was 190->this is typical for her when she works out. Her HR went down to 70 when she was feeling faint. She does admit that staying hydrated is difficult sine her bariatric surgery   The HR can fluctuate at baseline up to 130 if she goes up the stairs.   Never had heart monitor. She did a tilt table test in the past and she fainted. She was told it's postural- conservative measures were advised.       Tobacco:  She has never smoked tobacco.    Current Outpatient Medications   Medication Sig Dispense Refill    escitalopram 5 MG Oral Tab Take 1 tablet (5 mg total) by mouth daily. 90 tablet 0     Multiple Vitamin (ONE-DAILY MULTI VITAMINS) Oral Tab Take 1 tablet by mouth daily.           Review of Systems:  Review of Systems   Constitutional:  Negative for activity change, appetite change, fatigue and fever.   HENT: Negative.     Respiratory:  Positive for shortness of breath.    Cardiovascular:  Negative for chest pain and palpitations.   Gastrointestinal:  Negative for nausea and vomiting.   Musculoskeletal: Negative.    Neurological:  Negative for syncope, weakness and headaches.   Hematological: Negative.    Psychiatric/Behavioral: Negative.     All other systems reviewed and are negative.        Objective:   /80   Pulse 80   Resp 16   Ht 5' 6\" (1.676 m)   Wt 187 lb (84.8 kg)   LMP 05/08/2023 (Approximate)   BMI 30.18 kg/m²  Estimated body mass index is 30.18 kg/m² as calculated from the following:    Height as of this encounter: 5' 6\" (1.676 m).    Weight as of this encounter: 187 lb (84.8 kg).  Physical Exam  Constitutional:       Appearance: She is well-developed.   HENT:      Head: Normocephalic and atraumatic.   Neck:      Thyroid: No thyromegaly.   Cardiovascular:      Rate and Rhythm: Normal rate and regular rhythm.      Heart sounds: Normal heart sounds. No murmur heard.     No gallop.   Pulmonary:      Effort: Pulmonary effort is normal. No respiratory distress.      Breath sounds: Normal breath sounds. No wheezing.   Abdominal:      Palpations: Abdomen is soft.      Tenderness: There is no abdominal tenderness.   Musculoskeletal:         General: No swelling.      Cervical back: Neck supple.   Skin:     General: Skin is warm and dry.   Neurological:      General: No focal deficit present.      Mental Status: She is alert and oriented to person, place, and time.   Psychiatric:         Behavior: Behavior normal.         Thought Content: Thought content normal.         Judgment: Judgment normal.            Assessment & Plan:   1. Postural dizziness with near syncope (Primary)  -      CARD ECHO 2D DOPPLER (CPT=93306); Future; Expected date: 06/11/2024  2. Dizziness  -     CARD ZIO EXTENDED MONITOR 3-7 DAYS (CPT=93242/68068); Future; Expected date: 06/11/2024  -     CARD ECHO 2D DOPPLER (CPT=93306); Future; Expected date: 06/11/2024    Patient with episode of dizziness and past hx of dizziness.   Will pursue cardiac workup to r/o structural disease or arrhythmia.   Advised that she needs to increase hydration, avoid quick movements and over exertion.     No follow-ups on file.    Lucía Chun DO, 6/11/2024, 9:19 AM

## 2024-06-17 ENCOUNTER — HOSPITAL ENCOUNTER (OUTPATIENT)
Dept: CV DIAGNOSTICS | Facility: HOSPITAL | Age: 34
Discharge: HOME | End: 2024-06-17
Attending: FAMILY MEDICINE

## 2024-06-17 DIAGNOSIS — R42 DIZZINESS: ICD-10-CM

## 2024-06-17 DIAGNOSIS — R42 POSTURAL DIZZINESS WITH NEAR SYNCOPE: ICD-10-CM

## 2024-06-17 DIAGNOSIS — R55 POSTURAL DIZZINESS WITH NEAR SYNCOPE: ICD-10-CM

## 2024-06-17 PROCEDURE — 93242 EXT ECG>48HR<7D RECORDING: CPT | Performed by: FAMILY MEDICINE

## 2024-06-17 PROCEDURE — 93306 TTE W/DOPPLER COMPLETE: CPT | Performed by: FAMILY MEDICINE

## 2024-06-17 PROCEDURE — 93243 EXT ECG>48HR<7D SCAN A/R: CPT | Performed by: FAMILY MEDICINE

## 2024-07-17 ENCOUNTER — INITIAL PRENATAL (OUTPATIENT)
Facility: CLINIC | Age: 34
End: 2024-07-17
Payer: COMMERCIAL

## 2024-07-17 ENCOUNTER — MED REC SCAN ONLY (OUTPATIENT)
Facility: CLINIC | Age: 34
End: 2024-07-17

## 2024-07-17 VITALS
WEIGHT: 189 LBS | DIASTOLIC BLOOD PRESSURE: 64 MMHG | SYSTOLIC BLOOD PRESSURE: 106 MMHG | HEIGHT: 66 IN | BODY MASS INDEX: 30.37 KG/M2

## 2024-07-17 DIAGNOSIS — Z3A.09 9 WEEKS GESTATION OF PREGNANCY (HCC): Primary | ICD-10-CM

## 2024-07-17 DIAGNOSIS — Z36.89 ESTABLISH GESTATIONAL AGE, ULTRASOUND (HCC): ICD-10-CM

## 2024-07-17 DIAGNOSIS — O09.811 PREGNANCY RESULTING FROM ASSISTED REPRODUCTIVE TECHNOLOGY IN FIRST TRIMESTER (HCC): ICD-10-CM

## 2024-07-17 PROCEDURE — 87591 N.GONORRHOEAE DNA AMP PROB: CPT | Performed by: OBSTETRICS & GYNECOLOGY

## 2024-07-17 PROCEDURE — 87491 CHLMYD TRACH DNA AMP PROBE: CPT | Performed by: OBSTETRICS & GYNECOLOGY

## 2024-07-17 PROCEDURE — 87086 URINE CULTURE/COLONY COUNT: CPT | Performed by: OBSTETRICS & GYNECOLOGY

## 2024-07-17 NOTE — PROGRESS NOTES
LMP is accurate and period intervals are regular every   days.  First trimester symptoms have been  tolerable.  Denies weight loss.     Prenatal vitamins /  DHA / no Extra folic acid (4 mg i.e. 4,000 micrograms)  Family history, STD history, and PMHx was reveiwed  All  ER visits, OV visits or Ultrasounds done in the first trimester were reviewed and added to the medical record    Early Ultrasound was done.  Group philosophy explained - [facility] conservative approach, HARSHA/, OV frequency, does and don'ts in first trimester / teaching instructions  Flu shot was recommended.  First trimester counseling was done with the [facility] web site health library and kraig. suggestions:   WebMD pregnancy , What to Expect, The Bump, Pregnancy Tracker    First trimester pregnancy ultrasound done for viability on 24    Findings: Single viable intrauterine pregnancy at 9 weeks and 3 day which is consistent with the patient's gestational age.  The crown-rump length is 2.53 cm with a fetal heart rate of 178 bpm.  There are no adnexal masses.  There is no evidence of subchorionic hemorrhage.  The cervix is closed and long without evidence of funneling    Impression: Viable intrauterine pregnancy at 9 weeks for an EDC = 25      First trimester screen and Genetic counseling follow up visit explained along with the scheduling of a 3 week MACK visit.

## 2024-07-18 ENCOUNTER — MED REC SCAN ONLY (OUTPATIENT)
Facility: CLINIC | Age: 34
End: 2024-07-18

## 2024-07-22 ENCOUNTER — LAB ENCOUNTER (OUTPATIENT)
Dept: LAB | Age: 34
End: 2024-07-22
Attending: FAMILY MEDICINE
Payer: COMMERCIAL

## 2024-07-22 DIAGNOSIS — O09.811 PREGNANCY RESULTING FROM ASSISTED REPRODUCTIVE TECHNOLOGY IN FIRST TRIMESTER (HCC): ICD-10-CM

## 2024-07-22 LAB
ANTIBODY SCREEN: NEGATIVE
BASOPHILS # BLD AUTO: 0.06 X10(3) UL (ref 0–0.2)
BASOPHILS NFR BLD AUTO: 0.6 %
EOSINOPHIL # BLD AUTO: 0.12 X10(3) UL (ref 0–0.7)
EOSINOPHIL NFR BLD AUTO: 1.2 %
ERYTHROCYTE [DISTWIDTH] IN BLOOD BY AUTOMATED COUNT: 13.1 %
HCT VFR BLD AUTO: 38 %
HGB BLD-MCNC: 13.2 G/DL
IMM GRANULOCYTES # BLD AUTO: 0.05 X10(3) UL (ref 0–1)
IMM GRANULOCYTES NFR BLD: 0.5 %
LYMPHOCYTES # BLD AUTO: 3.01 X10(3) UL (ref 1–4)
LYMPHOCYTES NFR BLD AUTO: 30.9 %
MCH RBC QN AUTO: 31.6 PG (ref 26–34)
MCHC RBC AUTO-ENTMCNC: 34.7 G/DL (ref 31–37)
MCV RBC AUTO: 90.9 FL
MONOCYTES # BLD AUTO: 0.54 X10(3) UL (ref 0.1–1)
MONOCYTES NFR BLD AUTO: 5.5 %
NEUTROPHILS # BLD AUTO: 5.97 X10 (3) UL (ref 1.5–7.7)
NEUTROPHILS # BLD AUTO: 5.97 X10(3) UL (ref 1.5–7.7)
NEUTROPHILS NFR BLD AUTO: 61.3 %
PLATELET # BLD AUTO: 206 10(3)UL (ref 150–450)
RBC # BLD AUTO: 4.18 X10(6)UL
RH BLOOD TYPE: POSITIVE
WBC # BLD AUTO: 9.8 X10(3) UL (ref 4–11)

## 2024-07-22 PROCEDURE — 87340 HEPATITIS B SURFACE AG IA: CPT

## 2024-07-22 PROCEDURE — 86780 TREPONEMA PALLIDUM: CPT

## 2024-07-22 PROCEDURE — 86803 HEPATITIS C AB TEST: CPT

## 2024-07-22 PROCEDURE — 86762 RUBELLA ANTIBODY: CPT

## 2024-07-22 PROCEDURE — 85025 COMPLETE CBC W/AUTO DIFF WBC: CPT

## 2024-07-22 PROCEDURE — 86901 BLOOD TYPING SEROLOGIC RH(D): CPT

## 2024-07-22 PROCEDURE — 86900 BLOOD TYPING SEROLOGIC ABO: CPT

## 2024-07-22 PROCEDURE — 86850 RBC ANTIBODY SCREEN: CPT

## 2024-07-22 PROCEDURE — 36415 COLL VENOUS BLD VENIPUNCTURE: CPT

## 2024-07-22 PROCEDURE — 87389 HIV-1 AG W/HIV-1&-2 AB AG IA: CPT

## 2024-07-23 LAB
HBV SURFACE AG SER-ACNC: 0.44 [IU]/L
HBV SURFACE AG SERPL QL IA: NONREACTIVE
HCV AB SERPL QL IA: NONREACTIVE
RUBV IGG SER QL: POSITIVE
RUBV IGG SER-ACNC: 172 IU/ML (ref 10–?)
T PALLIDUM AB SER QL IA: NONREACTIVE

## 2024-07-24 DIAGNOSIS — F41.1 GAD (GENERALIZED ANXIETY DISORDER): ICD-10-CM

## 2024-07-25 RX ORDER — ESCITALOPRAM OXALATE 5 MG/1
5 TABLET ORAL DAILY
Qty: 90 TABLET | Refills: 1 | Status: SHIPPED | OUTPATIENT
Start: 2024-07-25

## 2024-07-25 NOTE — TELEPHONE ENCOUNTER
Requested Prescriptions     Pending Prescriptions Disp Refills    ESCITALOPRAM 5 MG Oral Tab [Pharmacy Med Name: ESCITALOPRAM 5MG TABLETS] 90 tablet 0     Sig: TAKE 1 TABLET(5 MG) BY MOUTH DAILY       LOV: 5/4/24  RTC:   Filled: 5/4/24 #90 with 0 refills    Future Appointments   Date Time Provider Department Center   8/6/2024  7:40 AM Jeyson Yun MD [facility]   8/19/2024  1:45 PM Luci Nunez DO [id number] [facility] [place]   8/20/2024  8:00 AM [facility] ULTRASOUND [facility] [place]

## 2024-08-06 ENCOUNTER — ROUTINE PRENATAL (OUTPATIENT)
Facility: CLINIC | Age: 34
End: 2024-08-06
Payer: COMMERCIAL

## 2024-08-06 VITALS
BODY MASS INDEX: 30.37 KG/M2 | SYSTOLIC BLOOD PRESSURE: 100 MMHG | DIASTOLIC BLOOD PRESSURE: 70 MMHG | HEIGHT: 66 IN | WEIGHT: 189 LBS

## 2024-08-06 DIAGNOSIS — Z34.01 ENCOUNTER FOR SUPERVISION OF NORMAL FIRST PREGNANCY IN FIRST TRIMESTER (HCC): Primary | ICD-10-CM

## 2024-08-06 LAB
GLUCOSE (URINE DIPSTICK): NEGATIVE MG/DL
NITRITE, URINE: NEGATIVE
PROTEIN (URINE DIPSTICK): NEGATIVE MG/DL

## 2024-08-06 PROCEDURE — 3074F SYST BP LT 130 MM HG: CPT | Performed by: OBSTETRICS & GYNECOLOGY

## 2024-08-06 PROCEDURE — 81002 URINALYSIS NONAUTO W/O SCOPE: CPT | Performed by: OBSTETRICS & GYNECOLOGY

## 2024-08-06 PROCEDURE — 3008F BODY MASS INDEX DOCD: CPT | Performed by: OBSTETRICS & GYNECOLOGY

## 2024-08-06 PROCEDURE — 3078F DIAST BP <80 MM HG: CPT | Performed by: OBSTETRICS & GYNECOLOGY

## 2024-08-06 NOTE — PROGRESS NOTES
Doing well, slight nausea throughout the day but manageable    Labs reviewed  Ultrasound reviewed if viability done  Discussed second trimester counseling  First trimester screen results and genetic testing reviewed, NIPS collected today  Second trimester teaching instructions  Follow up [facility] with next visit  Problem List updated

## 2024-08-20 ENCOUNTER — ULTRASOUND ENCOUNTER (OUTPATIENT)
Facility: CLINIC | Age: 34
End: 2024-08-20
Payer: COMMERCIAL

## 2024-08-20 DIAGNOSIS — Z36.9 FIRST TRIMESTER SCREENING (HCC): Primary | ICD-10-CM

## 2024-08-20 DIAGNOSIS — Z3A.14 14 WEEKS GESTATION OF PREGNANCY (HCC): ICD-10-CM

## 2024-08-20 PROCEDURE — 76813 OB US NUCHAL MEAS 1 GEST: CPT | Performed by: OBSTETRICS & GYNECOLOGY

## 2024-08-28 ENCOUNTER — MED REC SCAN ONLY (OUTPATIENT)
Facility: CLINIC | Age: 34
End: 2024-08-28

## 2024-09-09 ENCOUNTER — ROUTINE PRENATAL (OUTPATIENT)
Facility: CLINIC | Age: 34
End: 2024-09-09
Payer: COMMERCIAL

## 2024-09-09 ENCOUNTER — LAB ENCOUNTER (OUTPATIENT)
Dept: LAB | Age: 34
End: 2024-09-09
Attending: OBSTETRICS & GYNECOLOGY
Payer: COMMERCIAL

## 2024-09-09 VITALS — SYSTOLIC BLOOD PRESSURE: 116 MMHG | BODY MASS INDEX: 31 KG/M2 | WEIGHT: 189 LBS | DIASTOLIC BLOOD PRESSURE: 72 MMHG

## 2024-09-09 DIAGNOSIS — O09.812 PREGNANCY RESULTING FROM ASSISTED REPRODUCTIVE TECHNOLOGY IN SECOND TRIMESTER (HCC): Primary | ICD-10-CM

## 2024-09-09 DIAGNOSIS — Z36.1 NEED FOR MATERNAL SERUM ALPHA-PROTEIN (MSAFP) SCREENING (HCC): ICD-10-CM

## 2024-09-09 DIAGNOSIS — Z13.89 SCREENING FOR BLOOD OR PROTEIN IN URINE: ICD-10-CM

## 2024-09-09 LAB
APPEARANCE: CLEAR
GLUCOSE (URINE DIPSTICK): 250 MG/DL
LEUKOCYTES: NEGATIVE
NITRITE, URINE: NEGATIVE
PROTEIN (URINE DIPSTICK): NEGATIVE MG/DL
URINE-COLOR: YELLOW

## 2024-09-09 PROCEDURE — 36415 COLL VENOUS BLD VENIPUNCTURE: CPT

## 2024-09-09 PROCEDURE — 82105 ALPHA-FETOPROTEIN SERUM: CPT

## 2024-09-09 RX ORDER — CHOLECALCIFEROL (VITAMIN D3) 25 MCG
1 TABLET,CHEWABLE ORAL DAILY
COMMUNITY

## 2024-09-09 RX ORDER — ACETAMINOPHEN 500 MG
500 TABLET ORAL EVERY 6 HOURS PRN
COMMUNITY

## 2024-09-11 ENCOUNTER — MED REC SCAN ONLY (OUTPATIENT)
Facility: CLINIC | Age: 34
End: 2024-09-11

## 2024-09-13 LAB
AFP MOM: 1.97
AFP VALUE: 65.9 NG/ML
GA ON COLL DATE: 17.2 WEEKS
INSULIN DEP AFP: NO
MAT AGE AT EDD: 32.2 YR
MULTIPLE GEST AFP: NO
OSBR RISK 1 IN AFP: 861
WEIGHT AFP: 189 LBS

## 2024-09-24 ENCOUNTER — ULTRASOUND ENCOUNTER (OUTPATIENT)
Facility: CLINIC | Age: 34
End: 2024-09-24
Payer: COMMERCIAL

## 2024-09-24 DIAGNOSIS — Z3A.19 19 WEEKS GESTATION OF PREGNANCY (HCC): ICD-10-CM

## 2024-09-24 DIAGNOSIS — Z36.9 LOW RISK ULTRASOUND SURVEY IN PREGNANCY (HCC): Primary | ICD-10-CM

## 2024-09-24 LAB
BILIRUBIN: NEGATIVE
GLUCOSE (URINE DIPSTICK): NEGATIVE MG/DL
KETONES (URINE DIPSTICK): NEGATIVE MG/DL
LEUKOCYTES: NEGATIVE
NITRITE, URINE: NEGATIVE
PH, URINE: 7 (ref 4.5–8)
PROTEIN (URINE DIPSTICK): NEGATIVE MG/DL
SPECIFIC GRAVITY: 1.01 (ref 1–1.03)
UROBILINOGEN,SEMI-QN: 0.2 MG/DL (ref 0–1.9)

## 2024-09-24 PROCEDURE — 81003 URINALYSIS AUTO W/O SCOPE: CPT | Performed by: OBSTETRICS & GYNECOLOGY

## 2024-09-24 PROCEDURE — 76805 OB US >/= 14 WKS SNGL FETUS: CPT | Performed by: OBSTETRICS & GYNECOLOGY

## 2024-10-07 ENCOUNTER — ROUTINE PRENATAL (OUTPATIENT)
Facility: CLINIC | Age: 34
End: 2024-10-07
Payer: COMMERCIAL

## 2024-10-07 VITALS
HEART RATE: 68 BPM | SYSTOLIC BLOOD PRESSURE: 110 MMHG | DIASTOLIC BLOOD PRESSURE: 58 MMHG | WEIGHT: 195 LBS | BODY MASS INDEX: 31 KG/M2

## 2024-10-07 DIAGNOSIS — Z13.89 SCREENING FOR BLOOD OR PROTEIN IN URINE: ICD-10-CM

## 2024-10-07 DIAGNOSIS — Z34.02 ENCOUNTER FOR SUPERVISION OF NORMAL FIRST PREGNANCY IN SECOND TRIMESTER (HCC): Primary | ICD-10-CM

## 2024-10-07 LAB
APPEARANCE: CLEAR
BILIRUBIN: NEGATIVE
GLUCOSE (URINE DIPSTICK): NEGATIVE MG/DL
KETONES (URINE DIPSTICK): NEGATIVE MG/DL
LEUKOCYTES: NEGATIVE
NITRITE, URINE: NEGATIVE
OCCULT BLOOD: NEGATIVE
PH, URINE: 7 (ref 4.5–8)
PROTEIN (URINE DIPSTICK): NEGATIVE MG/DL
SPECIFIC GRAVITY: 1.02 (ref 1–1.03)
URINE-COLOR: YELLOW
UROBILINOGEN,SEMI-QN: 0.2 MG/DL (ref 0–1.9)

## 2024-10-08 ENCOUNTER — PATIENT MESSAGE (OUTPATIENT)
Facility: CLINIC | Age: 34
End: 2024-10-08

## 2024-10-08 DIAGNOSIS — Z34.02 ENCOUNTER FOR SUPERVISION OF NORMAL FIRST PREGNANCY IN SECOND TRIMESTER (HCC): Primary | ICD-10-CM

## 2024-11-08 ENCOUNTER — ROUTINE PRENATAL (OUTPATIENT)
Facility: CLINIC | Age: 34
End: 2024-11-08
Payer: COMMERCIAL

## 2024-11-08 ENCOUNTER — LAB ENCOUNTER (OUTPATIENT)
Dept: LAB | Age: 34
End: 2024-11-08
Attending: PEDIATRICS
Payer: COMMERCIAL

## 2024-11-08 VITALS — DIASTOLIC BLOOD PRESSURE: 60 MMHG | SYSTOLIC BLOOD PRESSURE: 104 MMHG | BODY MASS INDEX: 31 KG/M2 | WEIGHT: 194 LBS

## 2024-11-08 DIAGNOSIS — Z34.02 ENCOUNTER FOR SUPERVISION OF NORMAL FIRST PREGNANCY IN SECOND TRIMESTER (HCC): Primary | ICD-10-CM

## 2024-11-08 DIAGNOSIS — Z34.02 ENCOUNTER FOR SUPERVISION OF NORMAL FIRST PREGNANCY IN SECOND TRIMESTER (HCC): ICD-10-CM

## 2024-11-08 DIAGNOSIS — O99.810 ABNORMAL GLUCOSE TOLERANCE IN PREGNANCY (HCC): Primary | ICD-10-CM

## 2024-11-08 LAB
APPEARANCE: CLEAR
BASOPHILS # BLD AUTO: 0.03 X10(3) UL (ref 0–0.2)
BASOPHILS NFR BLD AUTO: 0.4 %
DEPRECATED HBV CORE AB SER IA-ACNC: 4 NG/ML
EOSINOPHIL # BLD AUTO: 0.01 X10(3) UL (ref 0–0.7)
EOSINOPHIL NFR BLD AUTO: 0.1 %
ERYTHROCYTE [DISTWIDTH] IN BLOOD BY AUTOMATED COUNT: 12.8 %
GLUCOSE (URINE DIPSTICK): NEGATIVE MG/DL
GLUCOSE 1H P GLC SERPL-MCNC: 151 MG/DL
HCT VFR BLD AUTO: 31.4 %
HGB BLD-MCNC: 10.7 G/DL
IMM GRANULOCYTES # BLD AUTO: 0.03 X10(3) UL (ref 0–1)
IMM GRANULOCYTES NFR BLD: 0.4 %
LEUKOCYTES: NEGATIVE
LYMPHOCYTES # BLD AUTO: 2.43 X10(3) UL (ref 1–4)
LYMPHOCYTES NFR BLD AUTO: 29.3 %
MCH RBC QN AUTO: 32 PG (ref 26–34)
MCHC RBC AUTO-ENTMCNC: 34.1 G/DL (ref 31–37)
MCV RBC AUTO: 94 FL
MONOCYTES # BLD AUTO: 0.43 X10(3) UL (ref 0.1–1)
MONOCYTES NFR BLD AUTO: 5.2 %
NEUTROPHILS # BLD AUTO: 5.36 X10 (3) UL (ref 1.5–7.7)
NEUTROPHILS # BLD AUTO: 5.36 X10(3) UL (ref 1.5–7.7)
NEUTROPHILS NFR BLD AUTO: 64.6 %
NITRITE, URINE: NEGATIVE
PLATELET # BLD AUTO: 178 10(3)UL (ref 150–450)
PROTEIN (URINE DIPSTICK): NEGATIVE MG/DL
RBC # BLD AUTO: 3.34 X10(6)UL
URINE-COLOR: YELLOW
WBC # BLD AUTO: 8.3 X10(3) UL (ref 4–11)

## 2024-11-08 PROCEDURE — 85025 COMPLETE CBC W/AUTO DIFF WBC: CPT

## 2024-11-08 PROCEDURE — 3074F SYST BP LT 130 MM HG: CPT | Performed by: OBSTETRICS & GYNECOLOGY

## 2024-11-08 PROCEDURE — 36415 COLL VENOUS BLD VENIPUNCTURE: CPT

## 2024-11-08 PROCEDURE — 82950 GLUCOSE TEST: CPT

## 2024-11-08 PROCEDURE — 3078F DIAST BP <80 MM HG: CPT | Performed by: OBSTETRICS & GYNECOLOGY

## 2024-11-08 PROCEDURE — 82728 ASSAY OF FERRITIN: CPT

## 2024-11-08 PROCEDURE — 81002 URINALYSIS NONAUTO W/O SCOPE: CPT | Performed by: OBSTETRICS & GYNECOLOGY

## 2024-11-08 NOTE — PROGRESS NOTES
In hospital delivering during visit  1hr GCT/CBC done today  Level 1 sonogram normal 9/24/24  Fu 4 weeks MACK

## 2024-11-22 ENCOUNTER — LAB ENCOUNTER (OUTPATIENT)
Dept: LAB | Age: 34
End: 2024-11-22
Attending: OBSTETRICS & GYNECOLOGY
Payer: COMMERCIAL

## 2024-11-22 ENCOUNTER — TELEPHONE (OUTPATIENT)
Facility: CLINIC | Age: 34
End: 2024-11-22

## 2024-11-22 DIAGNOSIS — O99.810 ABNORMAL GLUCOSE TOLERANCE IN PREGNANCY (HCC): ICD-10-CM

## 2024-11-22 DIAGNOSIS — O99.810 PREGNANCY-INDUCED GLUCOSE INTOLERANCE (HCC): Primary | ICD-10-CM

## 2024-11-22 LAB — GLUCOSE P FAST SERPL-MCNC: 85 MG/DL

## 2024-11-25 ENCOUNTER — TELEPHONE (OUTPATIENT)
Facility: CLINIC | Age: 34
End: 2024-11-25

## 2024-12-02 ENCOUNTER — NURSE ONLY (OUTPATIENT)
Dept: ENDOCRINOLOGY CLINIC | Facility: CLINIC | Age: 34
End: 2024-12-02
Payer: COMMERCIAL

## 2024-12-02 ENCOUNTER — TELEPHONE (OUTPATIENT)
Dept: ENDOCRINOLOGY CLINIC | Facility: CLINIC | Age: 34
End: 2024-12-02

## 2024-12-02 DIAGNOSIS — O24.410 DIET CONTROLLED GESTATIONAL DIABETES MELLITUS (GDM) IN SECOND TRIMESTER (HCC): Primary | ICD-10-CM

## 2024-12-02 RX ORDER — BLOOD SUGAR DIAGNOSTIC
STRIP MISCELLANEOUS
Qty: 150 STRIP | Refills: 3 | Status: SHIPPED | OUTPATIENT
Start: 2024-12-02 | End: 2025-12-02

## 2024-12-02 RX ORDER — BLOOD-GLUCOSE METER
1 EACH MISCELLANEOUS AS DIRECTED
Qty: 1 KIT | Refills: 0 | Status: SHIPPED | OUTPATIENT
Start: 2024-12-02

## 2024-12-02 RX ORDER — LANCETS 30 GAUGE
1 EACH MISCELLANEOUS 4 TIMES DAILY
Qty: 200 EACH | Refills: 3 | Status: SHIPPED | OUTPATIENT
Start: 2024-12-02

## 2024-12-02 NOTE — PROGRESS NOTES
Cady Garcia  :1990 was seen for Gestational Diabetes Counseling: Individual/Group  Pt. verbally consents to a telemedicine service with live, interactive video and audio on 24 Patient understands and accepts financial responsibility for any deductible, co-insurance and/or co-pays associated with this service.     Date: 2024  Referring Provider: Dr. RACHANA Yun  Start time: 9am  End time: 10am    Assessment:LMP 2024 (Exact Date)     : 1   Para: 0  YOSVANY: 2/15/25    History of GMD:  NO; father hasType 2(mother had GDM with her) Pt. has h/o gastric Bypass surgery    GDM Screen: 151 mg/dl      3 HR GTT:(Pt. 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  Work hours: 7:30am - 6pm     Obtained usual diet history: Eats 3 meals/day + snacks( mainly fruit)  B:off days 7-7:30am : wk days 6am avacado toast-sourdough or Eng muffin )  , 1/2 the week eggs  Snack10:30-11am w -banana smart pop popcorn   L: wk days or non-wk days 1-2pm stuffed peppers,1 turkey tacos (low-carb flour) leftovers , berries or bananas( eats protein 1st)   4:30-5pm Snack: wk or non-wk days Chobani  Flips yogurt  D:Wk days 6pm non-wk days 7pm turkey, chx, beef, pork , no fish ; chic pea pasta or brown or basmati rice 1-2x/wk; potaoes- sweet potatoes 1-2x/wk ( no corn or peas) veggie for dinner 1-2 pieces  8pm HS snack: popcorn, protein ice cream   Eats out 2-3x/wk any meal: Bar/grill wrap or breakfast sandwich from [employer]  Drinks water w/water enhancers (no coffee) , almond milk , frozen coke (no carb)     Exercise: was taking dog for a walk  in warmer weather ;now possibly 1x/wk     Education:     GDM Overview:  Reviewed gestational diabetes as diagnosis including target blood glucose values.  Benefits, risks, and management options for improving/maintaining glucose control to mother/baby discussed.    Healthy Eating:  Discussed nutrition concepts for  pregnancy/healthy eating and effects of food on BG value.  Timing of meals; what is a carbohydrate, protein, fat.  Taught: Carb counting, label reading, meal planning.  Suggested Calorie Level: 2000    Being Active:  Benefits and effects of activity on BG discussed.  Reviewed types of recommended activity, duration, precautions, and when to call MD.    Monitoring:  Instructed on how to use glucose monitor/proper lancet disposal. Testing schedules are:   Fastin-95 mg/dL, Call MD is >105 md/dL twice in 1 week   2 Hour Post Prandial:  120 md/dL, Call MD if >140 md/dL twice in 1 week.    Instructed on how to perform blood glucose testing on: Onetouch Verio Flex meter  Prescription sent for diabetes testing supplies.  Discussed monitoring ketones.    Taking Medication:  Reviewed when medication might be indicated.    Reducing Risk:  Effects of elevated blood glucose on mother/baby reviewed.  Discussed management (hyperglycemia, hypoglycemia, sick day, other) and when to call provider.  Post pregnancy management/prevention of Type 2 DM, and increased risk of having diabetes later in life reviewed.    Training Tools Provided:  [facility] Diabetes and Pregnancy: A guide to self management  BG Log Sheets    Recommendations:      1. Follow recommended meal plan.   2. Begin testing fasting glucose and 2 hour after meals   3. Bring glucose / food log to next MD office visit.   4. Encouraged activity if no restrictions.   5. Encouraged Cady to call diabetes center with any questions or concerns.   6. If fasting glucose is above 105 mg/dL or after meal above 140 mg/dL for 2 days in a row please call your OB   7. Follow-up in 2 wks       Patient verbalized understanding and has no further questions at this time.    Shaista Andrade RN, [facility]

## 2024-12-06 ENCOUNTER — ROUTINE PRENATAL (OUTPATIENT)
Facility: CLINIC | Age: 34
End: 2024-12-06
Payer: COMMERCIAL

## 2024-12-06 ENCOUNTER — LAB ENCOUNTER (OUTPATIENT)
Dept: LAB | Age: 34
End: 2024-12-06
Attending: OBSTETRICS & GYNECOLOGY
Payer: COMMERCIAL

## 2024-12-06 VITALS — WEIGHT: 198 LBS | DIASTOLIC BLOOD PRESSURE: 60 MMHG | BODY MASS INDEX: 32 KG/M2 | SYSTOLIC BLOOD PRESSURE: 108 MMHG

## 2024-12-06 DIAGNOSIS — D50.8 OTHER IRON DEFICIENCY ANEMIA: ICD-10-CM

## 2024-12-06 DIAGNOSIS — O99.810 PREGNANCY-INDUCED GLUCOSE INTOLERANCE (HCC): ICD-10-CM

## 2024-12-06 DIAGNOSIS — Z13.89 SCREENING FOR BLOOD OR PROTEIN IN URINE: ICD-10-CM

## 2024-12-06 DIAGNOSIS — Z34.03 ENCOUNTER FOR SUPERVISION OF NORMAL FIRST PREGNANCY IN THIRD TRIMESTER (HCC): Primary | ICD-10-CM

## 2024-12-06 LAB
APPEARANCE: CLEAR
DEPRECATED HBV CORE AB SER IA-ACNC: 7 NG/ML
ERYTHROCYTE [DISTWIDTH] IN BLOOD BY AUTOMATED COUNT: 13.3 %
EST. AVERAGE GLUCOSE BLD GHB EST-MCNC: 100 MG/DL (ref 68–126)
GLUCOSE (URINE DIPSTICK): NEGATIVE MG/DL
HBA1C MFR BLD: 5.1 % (ref ?–5.7)
HCT VFR BLD AUTO: 34.3 %
HGB BLD-MCNC: 11.5 G/DL
LEUKOCYTES: NEGATIVE
MCH RBC QN AUTO: 31.8 PG (ref 26–34)
MCHC RBC AUTO-ENTMCNC: 33.5 G/DL (ref 31–37)
MCV RBC AUTO: 94.8 FL
NITRITE, URINE: NEGATIVE
PLATELET # BLD AUTO: 179 10(3)UL (ref 150–450)
PROTEIN (URINE DIPSTICK): NEGATIVE MG/DL
RBC # BLD AUTO: 3.62 X10(6)UL
URINE-COLOR: YELLOW
WBC # BLD AUTO: 9.1 X10(3) UL (ref 4–11)

## 2024-12-06 PROCEDURE — 3078F DIAST BP <80 MM HG: CPT | Performed by: OBSTETRICS & GYNECOLOGY

## 2024-12-06 PROCEDURE — 85027 COMPLETE CBC AUTOMATED: CPT

## 2024-12-06 PROCEDURE — 3074F SYST BP LT 130 MM HG: CPT | Performed by: OBSTETRICS & GYNECOLOGY

## 2024-12-06 PROCEDURE — 36415 COLL VENOUS BLD VENIPUNCTURE: CPT

## 2024-12-06 PROCEDURE — 81002 URINALYSIS NONAUTO W/O SCOPE: CPT | Performed by: OBSTETRICS & GYNECOLOGY

## 2024-12-06 PROCEDURE — 82728 ASSAY OF FERRITIN: CPT

## 2024-12-06 PROCEDURE — 83036 HEMOGLOBIN GLYCOSYLATED A1C: CPT

## 2024-12-06 NOTE — PROGRESS NOTES
Unable to tolerate 3 hr GTT - fasting normal  Diet and AccU checks - dietician seen 24  Sugars: Fastin-89               2hr PP:    Ok to stop checking sugars - HbA1C with repeat cbc and ferritin  Fu 2 weeks MACK and growth ultrasound

## 2024-12-16 ENCOUNTER — NURSE ONLY (OUTPATIENT)
Dept: ENDOCRINOLOGY CLINIC | Facility: CLINIC | Age: 34
End: 2024-12-16
Payer: COMMERCIAL

## 2024-12-16 NOTE — PROGRESS NOTES
Cady Garcia  : 1990 was seen for GDM  Individual Follow-Up Counseling  Pt. verbally consents to a telemedicine service with live, interactive video and audio on 24. Patient understands and accepts financial responsibility for any deductible, co-insurance and/or co-pays associated with this service.     Date: 2024  Referring Provider: Dr. RACHANA Yun  Start time: 12:30pm End time: 1pm    Assessment: LMP 2024 (Exact Date)   Weight:   Wt Readings from Last 6 Encounters:   24 198 lb   24 194 lb   10/07/24 195 lb   24 189 lb   24 189 lb   24 189 lb     BLOOD GLUCOSE TRENDS:    Fasting - 83 mg/dl - 92 mg/dl (0 number(s) out of target)    2 hr post bkfst - 79 mg/dl - 118 mg/dl (0 number(s) out of target)    2 hour post lunch - 73 mg/dl - 102 mg/dl (0 number(s) out of target)    2 hour post dinner/bed -86 mg/dl - 170 mg/dl (1 number(s) out of target)        EDUCATION:      HEALTHY EATING      Review/Assess/ food log: had a day where she ate at chinese restaurant & ate pancakes w/sugar free  syrup. BG spiked to 203 mg/dl(orange chx,fried rice) but took a few hrs to come down & then dropped to 60 mg/dl. Not always eating a bedtime snack & is too tired to walk after dinner. A friend told her the walking made a difference.                                                                                                                    MONITORING    Interpreting results/pattern management:Fastings are within target range              TAKING MEDICATION  ORAL MEDICATIONS: Instructed about 2 oral medications approved for use during pregnancy;Metformin & Glyburide (discussed possible side effects)  INSULIN:  Instructed about insulin in the event she needs insulin for BG control      Type of insulin, action time, precautions, storage                                                                Proper Sharps disposal                                                                                                  PROBLEM SOLVING    HYPERGLYCEMIA(High blood glucose level:  <120 mg/dL)    Causes and S&Sx, Prevention         HYPOGLYCEMIA (Low blood glucose level:  <60 mg/dL)    Causes, S&Sx, Tx and prevention    POSTPARTUM CARE                                                                          Effect of GDM on future pregnancies and importance of preconception counseling    Importance of  weight loss and exercise to prevent type 2 DM later in life    SMBG fasting, pre meal and 2 hr p.p. 2 days a week for 6 weeks    Importance of having FPG at yearly exam      REDUCING RISKS     Relationship between glucose control and risk for complications       Blood Pressure Control      Recommendations:      - Healthy Eating:         Follow recommended meal plan.      Record meals/snacks on food/BG log    - Being Active:          Walk 10-15  minutes after all meals if possible    - Monitoring:          BG testing 4x/ day: Fasting and 2 hr post meals; record on log and bring to all appt w/ meter               Send via fax/ e-mail, BG/food log report  to OB/GYN- as instructed    - Problem Solving:          Detect, treat, prevent hypo/hyperglycemia appropriately    - Reducing Risks:          Preventing type 2 diabetes by losing weight, healthy eating and exercise       Keep blood glucose within goals for healthy outcomes for mother/baby      - Follow-up as needed      Patient verbalized understanding and has no further questions at this time.    Shaista Andrade RN, [facility]

## 2024-12-23 ENCOUNTER — ROUTINE PRENATAL (OUTPATIENT)
Facility: CLINIC | Age: 34
End: 2024-12-23
Payer: COMMERCIAL

## 2024-12-23 VITALS
SYSTOLIC BLOOD PRESSURE: 110 MMHG | WEIGHT: 198 LBS | BODY MASS INDEX: 31.82 KG/M2 | DIASTOLIC BLOOD PRESSURE: 82 MMHG | HEIGHT: 66 IN

## 2024-12-23 DIAGNOSIS — Z13.89 SCREENING FOR BLOOD OR PROTEIN IN URINE: ICD-10-CM

## 2024-12-23 DIAGNOSIS — Z34.03 ENCOUNTER FOR SUPERVISION OF NORMAL FIRST PREGNANCY IN THIRD TRIMESTER (HCC): Primary | ICD-10-CM

## 2024-12-23 DIAGNOSIS — O12.13 PROTEINURIA AFFECTING PREGNANCY IN THIRD TRIMESTER (HCC): ICD-10-CM

## 2024-12-23 DIAGNOSIS — O99.810 PREGNANCY-INDUCED GLUCOSE INTOLERANCE (HCC): ICD-10-CM

## 2024-12-23 LAB
CREAT UR-SCNC: 520.3 MG/DL
GLUCOSE (URINE DIPSTICK): NEGATIVE MG/DL
KETONES (URINE DIPSTICK): 80 MG/DL
MULTISTIX EXPIRATION DATE: ABNORMAL DATE
NITRITE, URINE: NEGATIVE
OCCULT BLOOD: NEGATIVE
PH, URINE: 6 (ref 4.5–8)
PROT UR-MCNC: 75.5 MG/DL (ref ?–14)
PROT/CREAT UR-RTO: 0.15
PROTEIN (URINE DIPSTICK): 100 MG/DL
SPECIFIC GRAVITY: 1.02 (ref 1–1.03)
UROBILINOGEN,SEMI-QN: 1 MG/DL (ref 0–1.9)

## 2024-12-23 PROCEDURE — 82570 ASSAY OF URINE CREATININE: CPT

## 2024-12-23 PROCEDURE — 3079F DIAST BP 80-89 MM HG: CPT

## 2024-12-23 PROCEDURE — 81002 URINALYSIS NONAUTO W/O SCOPE: CPT

## 2024-12-23 PROCEDURE — 84156 ASSAY OF PROTEIN URINE: CPT

## 2024-12-23 PROCEDURE — 3074F SYST BP LT 130 MM HG: CPT

## 2024-12-23 PROCEDURE — 3008F BODY MASS INDEX DOCD: CPT

## 2024-12-23 NOTE — PROGRESS NOTES
Doing well, good FM. Getting over the flu, hasn't been checking sugars x4 days  Saw diabetic educator 12/16 - HbA1c from 12/6 5.1%  Sugars: Fasting = 84-86   2hr PP = 119/109/102  Wants to wait on Tdap as she is getting over the flu - will get next week when she comes back for growth ultrasound  100 protein in urine today - will check protein/creatinine ratio  Growth ultrasound scheduled for 12/31 due to holidays  Fu in 1 week for growth ultrasound, 2 weeks for MACK

## 2024-12-31 ENCOUNTER — NURSE ONLY (OUTPATIENT)
Facility: CLINIC | Age: 34
End: 2024-12-31
Payer: COMMERCIAL

## 2024-12-31 ENCOUNTER — ULTRASOUND ENCOUNTER (OUTPATIENT)
Facility: CLINIC | Age: 34
End: 2024-12-31
Payer: COMMERCIAL

## 2024-12-31 VITALS
BODY MASS INDEX: 31.82 KG/M2 | DIASTOLIC BLOOD PRESSURE: 76 MMHG | WEIGHT: 198 LBS | HEIGHT: 66 IN | SYSTOLIC BLOOD PRESSURE: 118 MMHG

## 2024-12-31 DIAGNOSIS — Z3A.33 33 WEEKS GESTATION OF PREGNANCY (HCC): ICD-10-CM

## 2024-12-31 DIAGNOSIS — Z36.89 ENCOUNTER FOR ULTRASOUND TO ASSESS FETAL GROWTH (HCC): ICD-10-CM

## 2024-12-31 DIAGNOSIS — O09.813 PREGNANCY RESULTING FROM ASSISTED REPRODUCTIVE TECHNOLOGY IN THIRD TRIMESTER (HCC): Primary | ICD-10-CM

## 2024-12-31 PROCEDURE — 90471 IMMUNIZATION ADMIN: CPT

## 2024-12-31 PROCEDURE — 90715 TDAP VACCINE 7 YRS/> IM: CPT

## 2025-01-03 DIAGNOSIS — F41.1 GAD (GENERALIZED ANXIETY DISORDER): ICD-10-CM

## 2025-01-03 RX ORDER — ESCITALOPRAM OXALATE 5 MG/1
5 TABLET ORAL DAILY
Qty: 90 TABLET | Refills: 0 | Status: SHIPPED | OUTPATIENT
Start: 2025-01-03

## 2025-01-03 NOTE — TELEPHONE ENCOUNTER
Requesting Escitalopram 5mg  LOV: 6/11/24  RTC: prn  Last Relevant Labs:   Filled: 7/25/24 #90 with 1 refills    Future Appointments   Date Time Provider Department Center   1/10/2025 11:30 AM Jeyson Yun MD [facility] [place]     Psychiatric Non-Scheduled (Anti-Anxiety) [id number]/2025 02:30 PM   Protocol Details   In person appointment or virtual visit in the past 6 mos or appointment in next 3 mos    Depression Screening completed within the past 12 months     Rx sent to pharmacy per protocol

## 2025-01-10 ENCOUNTER — ROUTINE PRENATAL (OUTPATIENT)
Facility: CLINIC | Age: 35
End: 2025-01-10
Payer: COMMERCIAL

## 2025-01-10 VITALS — DIASTOLIC BLOOD PRESSURE: 82 MMHG | BODY MASS INDEX: 32 KG/M2 | SYSTOLIC BLOOD PRESSURE: 122 MMHG | WEIGHT: 199 LBS

## 2025-01-10 DIAGNOSIS — Z13.89 SCREENING FOR BLOOD OR PROTEIN IN URINE: ICD-10-CM

## 2025-01-10 DIAGNOSIS — Z34.03 ENCOUNTER FOR SUPERVISION OF NORMAL FIRST PREGNANCY IN THIRD TRIMESTER (HCC): Primary | ICD-10-CM

## 2025-01-10 DIAGNOSIS — O24.410 DIET CONTROLLED GESTATIONAL DIABETES MELLITUS (GDM) IN THIRD TRIMESTER (HCC): ICD-10-CM

## 2025-01-10 LAB
GLUCOSE (URINE DIPSTICK): NEGATIVE MG/DL
NITRITE, URINE: NEGATIVE

## 2025-01-10 PROCEDURE — 3079F DIAST BP 80-89 MM HG: CPT

## 2025-01-10 PROCEDURE — 3074F SYST BP LT 130 MM HG: CPT

## 2025-01-10 PROCEDURE — 81002 URINALYSIS NONAUTO W/O SCOPE: CPT

## 2025-01-10 NOTE — PROGRESS NOTES
Doing well, good FM  Received Tdap when she came in for growth ultrasound on 12/31  Growth ultrasound from 12/31 - vertex, EFW 54%  Sugars have been great:   Fasting = 80-83   2 hour PP =   HIV and RPR #2 ordered  Fu in 2 weeks MACK

## 2025-01-24 ENCOUNTER — ROUTINE PRENATAL (OUTPATIENT)
Facility: CLINIC | Age: 35
End: 2025-01-24
Payer: COMMERCIAL

## 2025-01-24 ENCOUNTER — LAB ENCOUNTER (OUTPATIENT)
Dept: LAB | Age: 35
End: 2025-01-24
Payer: COMMERCIAL

## 2025-01-24 VITALS — WEIGHT: 198.81 LBS | DIASTOLIC BLOOD PRESSURE: 74 MMHG | SYSTOLIC BLOOD PRESSURE: 110 MMHG | BODY MASS INDEX: 32 KG/M2

## 2025-01-24 DIAGNOSIS — Z13.89 SCREENING FOR BLOOD OR PROTEIN IN URINE: ICD-10-CM

## 2025-01-24 DIAGNOSIS — Z36.85 ANTENATAL SCREENING FOR STREPTOCOCCUS B (HCC): ICD-10-CM

## 2025-01-24 DIAGNOSIS — Z34.03 ENCOUNTER FOR SUPERVISION OF NORMAL FIRST PREGNANCY IN THIRD TRIMESTER (HCC): ICD-10-CM

## 2025-01-24 DIAGNOSIS — O09.813 PREGNANCY RESULTING FROM ASSISTED REPRODUCTIVE TECHNOLOGY IN THIRD TRIMESTER (HCC): ICD-10-CM

## 2025-01-24 DIAGNOSIS — O24.410 DIET CONTROLLED GESTATIONAL DIABETES MELLITUS (GDM) IN THIRD TRIMESTER (HCC): Primary | ICD-10-CM

## 2025-01-24 LAB
APPEARANCE: CLEAR
GLUCOSE (URINE DIPSTICK): NEGATIVE MG/DL
LEUKOCYTES: NEGATIVE
NITRITE, URINE: NEGATIVE
PROTEIN (URINE DIPSTICK): NEGATIVE MG/DL
URINE-COLOR: YELLOW

## 2025-01-24 PROCEDURE — 87150 DNA/RNA AMPLIFIED PROBE: CPT

## 2025-01-24 PROCEDURE — 86592 SYPHILIS TEST NON-TREP QUAL: CPT

## 2025-01-24 PROCEDURE — 87081 CULTURE SCREEN ONLY: CPT

## 2025-01-24 PROCEDURE — 81002 URINALYSIS NONAUTO W/O SCOPE: CPT

## 2025-01-24 PROCEDURE — 36415 COLL VENOUS BLD VENIPUNCTURE: CPT

## 2025-01-24 PROCEDURE — 87389 HIV-1 AG W/HIV-1&-2 AB AG IA: CPT

## 2025-01-24 PROCEDURE — 3078F DIAST BP <80 MM HG: CPT

## 2025-01-24 PROCEDURE — 3074F SYST BP LT 130 MM HG: CPT

## 2025-01-24 NOTE — PROGRESS NOTES
Doing well, reports good FM  Sugars have been good:   Fasting = 70-80s   2 hr PP =   GBS collected today  Had HIV and RPR #2 drawn earlier today, results pending  L&D precautions given  Fu in 1 week MACK

## 2025-01-26 LAB
GROUP B STREP BY PCR FOR PCR OVT: NEGATIVE
RPR SER QL: NONREACTIVE

## 2025-01-31 ENCOUNTER — ROUTINE PRENATAL (OUTPATIENT)
Facility: CLINIC | Age: 35
End: 2025-01-31
Payer: COMMERCIAL

## 2025-01-31 VITALS — SYSTOLIC BLOOD PRESSURE: 130 MMHG | BODY MASS INDEX: 32 KG/M2 | WEIGHT: 196 LBS | DIASTOLIC BLOOD PRESSURE: 88 MMHG

## 2025-01-31 DIAGNOSIS — O12.13 PROTEINURIA AFFECTING PREGNANCY IN THIRD TRIMESTER (HCC): ICD-10-CM

## 2025-01-31 DIAGNOSIS — Z13.89 SCREENING FOR BLOOD OR PROTEIN IN URINE: ICD-10-CM

## 2025-01-31 DIAGNOSIS — O09.813 PREGNANCY RESULTING FROM ASSISTED REPRODUCTIVE TECHNOLOGY IN THIRD TRIMESTER (HCC): ICD-10-CM

## 2025-01-31 DIAGNOSIS — O24.410 DIET CONTROLLED GESTATIONAL DIABETES MELLITUS (GDM) IN THIRD TRIMESTER (HCC): Primary | ICD-10-CM

## 2025-01-31 LAB
BILIRUBIN: NEGATIVE
CREAT UR-SCNC: 186.4 MG/DL
GLUCOSE (URINE DIPSTICK): NEGATIVE MG/DL
NITRITE, URINE: NEGATIVE
OCCULT BLOOD: NEGATIVE
PH, URINE: 6 (ref 4.5–8)
PROT UR-MCNC: 18.9 MG/DL (ref ?–14)
PROT/CREAT UR-RTO: 0.1
PROTEIN (URINE DIPSTICK): 30 MG/DL
SPECIFIC GRAVITY: 1.01 (ref 1–1.03)
UROBILINOGEN,SEMI-QN: 0.2 MG/DL (ref 0–1.9)

## 2025-01-31 PROCEDURE — 3079F DIAST BP 80-89 MM HG: CPT

## 2025-01-31 PROCEDURE — 84156 ASSAY OF PROTEIN URINE: CPT

## 2025-01-31 PROCEDURE — 82570 ASSAY OF URINE CREATININE: CPT

## 2025-01-31 PROCEDURE — 3075F SYST BP GE 130 - 139MM HG: CPT

## 2025-01-31 PROCEDURE — 81002 URINALYSIS NONAUTO W/O SCOPE: CPT

## 2025-01-31 NOTE — PROGRESS NOTES
Reports good FM, denies contractions  Has been feeling \"off\" the past few days - has checked BP at home and normal (120s/80s)  Sugars have been stable, numbers reviewed  +30 protein in urine today with BP higher than normal, will check protein-creatinine ratio and start tracking BPs at home twice a day. Discussed calling if >140/90  L&D precautions given  Fu in 1 week MACK

## 2025-02-02 ENCOUNTER — PATIENT MESSAGE (OUTPATIENT)
Facility: CLINIC | Age: 35
End: 2025-02-02

## 2025-02-03 ENCOUNTER — TELEPHONE (OUTPATIENT)
Facility: CLINIC | Age: 35
End: 2025-02-03

## 2025-02-04 ENCOUNTER — ROUTINE PRENATAL (OUTPATIENT)
Facility: CLINIC | Age: 35
End: 2025-02-04
Payer: COMMERCIAL

## 2025-02-04 VITALS
SYSTOLIC BLOOD PRESSURE: 120 MMHG | BODY MASS INDEX: 31.5 KG/M2 | HEIGHT: 66 IN | DIASTOLIC BLOOD PRESSURE: 82 MMHG | WEIGHT: 196 LBS

## 2025-02-04 DIAGNOSIS — O09.813 PREGNANCY RESULTING FROM ASSISTED REPRODUCTIVE TECHNOLOGY IN THIRD TRIMESTER (HCC): ICD-10-CM

## 2025-02-04 DIAGNOSIS — Z34.03 ENCOUNTER FOR SUPERVISION OF NORMAL FIRST PREGNANCY IN THIRD TRIMESTER (HCC): Primary | ICD-10-CM

## 2025-02-04 DIAGNOSIS — O24.410 DIET CONTROLLED GESTATIONAL DIABETES MELLITUS (GDM) IN THIRD TRIMESTER (HCC): ICD-10-CM

## 2025-02-04 DIAGNOSIS — Z3A.38 38 WEEKS GESTATION OF PREGNANCY (HCC): ICD-10-CM

## 2025-02-04 LAB
GLUCOSE (URINE DIPSTICK): NEGATIVE MG/DL
NITRITE, URINE: NEGATIVE
PROTEIN (URINE DIPSTICK): 30 MG/DL

## 2025-02-04 PROCEDURE — 76819 FETAL BIOPHYS PROFIL W/O NST: CPT | Performed by: OBSTETRICS & GYNECOLOGY

## 2025-02-04 PROCEDURE — 3079F DIAST BP 80-89 MM HG: CPT | Performed by: OBSTETRICS & GYNECOLOGY

## 2025-02-04 PROCEDURE — 3008F BODY MASS INDEX DOCD: CPT | Performed by: OBSTETRICS & GYNECOLOGY

## 2025-02-04 PROCEDURE — 3074F SYST BP LT 130 MM HG: CPT | Performed by: OBSTETRICS & GYNECOLOGY

## 2025-02-04 PROCEDURE — 81002 URINALYSIS NONAUTO W/O SCOPE: CPT | Performed by: OBSTETRICS & GYNECOLOGY

## 2025-02-06 ENCOUNTER — TELEPHONE (OUTPATIENT)
Facility: CLINIC | Age: 35
End: 2025-02-06

## 2025-02-06 ENCOUNTER — HOSPITAL ENCOUNTER (OUTPATIENT)
Facility: HOSPITAL | Age: 35
Discharge: HOME | End: 2025-02-06
Attending: OBSTETRICS & GYNECOLOGY | Admitting: OBSTETRICS & GYNECOLOGY
Payer: COMMERCIAL

## 2025-02-06 VITALS
HEIGHT: 65.98 IN | HEART RATE: 74 BPM | WEIGHT: 196 LBS | DIASTOLIC BLOOD PRESSURE: 90 MMHG | BODY MASS INDEX: 31.5 KG/M2 | SYSTOLIC BLOOD PRESSURE: 132 MMHG | TEMPERATURE: 98 F | RESPIRATION RATE: 18 BRPM

## 2025-02-06 PROBLEM — O24.410 DIET CONTROLLED GESTATIONAL DIABETES MELLITUS (GDM) IN THIRD TRIMESTER (HCC): Status: ACTIVE | Noted: 2025-02-06

## 2025-02-06 LAB
ALBUMIN SERPL-MCNC: 3.7 G/DL (ref 3.2–4.8)
ALBUMIN/GLOB SERPL: 1.4 {RATIO} (ref 1–2)
ALP LIVER SERPL-CCNC: 368 U/L
ALT SERPL-CCNC: 25 U/L
ANION GAP SERPL CALC-SCNC: 10 MMOL/L (ref 0–18)
AST SERPL-CCNC: 31 U/L (ref ?–34)
BASOPHILS # BLD AUTO: 0.05 X10(3) UL (ref 0–0.2)
BASOPHILS NFR BLD AUTO: 0.6 %
BILIRUB SERPL-MCNC: 0.2 MG/DL (ref 0.3–1.2)
BUN BLD-MCNC: 12 MG/DL (ref 9–23)
CALCIUM BLD-MCNC: 9 MG/DL (ref 8.7–10.6)
CHLORIDE SERPL-SCNC: 103 MMOL/L (ref 98–112)
CO2 SERPL-SCNC: 22 MMOL/L (ref 21–32)
CREAT BLD-MCNC: 0.91 MG/DL
CREAT UR-SCNC: 158 MG/DL
EGFRCR SERPLBLD CKD-EPI 2021: 86 ML/MIN/1.73M2 (ref 60–?)
EOSINOPHIL # BLD AUTO: 0.12 X10(3) UL (ref 0–0.7)
EOSINOPHIL NFR BLD AUTO: 1.5 %
ERYTHROCYTE [DISTWIDTH] IN BLOOD BY AUTOMATED COUNT: 13.3 %
FASTING STATUS PATIENT QL REPORTED: NO
GLOBULIN PLAS-MCNC: 2.6 G/DL (ref 2–3.5)
GLUCOSE BLD-MCNC: 102 MG/DL (ref 70–99)
HCT VFR BLD AUTO: 35.6 %
HGB BLD-MCNC: 12.4 G/DL
IMM GRANULOCYTES # BLD AUTO: 0.03 X10(3) UL (ref 0–1)
IMM GRANULOCYTES NFR BLD: 0.4 %
LYMPHOCYTES # BLD AUTO: 3.67 X10(3) UL (ref 1–4)
LYMPHOCYTES NFR BLD AUTO: 46.9 %
MCH RBC QN AUTO: 30.5 PG (ref 26–34)
MCHC RBC AUTO-ENTMCNC: 34.8 G/DL (ref 31–37)
MCV RBC AUTO: 87.5 FL
MONOCYTES # BLD AUTO: 0.36 X10(3) UL (ref 0.1–1)
MONOCYTES NFR BLD AUTO: 4.6 %
NEUTROPHILS # BLD AUTO: 3.59 X10 (3) UL (ref 1.5–7.7)
NEUTROPHILS # BLD AUTO: 3.59 X10(3) UL (ref 1.5–7.7)
NEUTROPHILS NFR BLD AUTO: 46 %
OSMOLALITY SERPL CALC.SUM OF ELEC: 280 MOSM/KG (ref 275–295)
PLATELET # BLD AUTO: 156 10(3)UL (ref 150–450)
POTASSIUM SERPL-SCNC: 3.6 MMOL/L (ref 3.5–5.1)
PROT SERPL-MCNC: 6.3 G/DL (ref 5.7–8.2)
PROT UR-MCNC: 27.3 MG/DL (ref ?–14)
PROT/CREAT UR-RTO: 0.17
RBC # BLD AUTO: 4.07 X10(6)UL
SODIUM SERPL-SCNC: 135 MMOL/L (ref 136–145)
URATE SERPL-MCNC: 7.1 MG/DL
WBC # BLD AUTO: 7.8 X10(3) UL (ref 4–11)

## 2025-02-06 PROCEDURE — 59025 FETAL NON-STRESS TEST: CPT | Performed by: OBSTETRICS & GYNECOLOGY

## 2025-02-06 NOTE — PROGRESS NOTES
Pt is a 34 year old female admitted to TRG2/TRG2-A.     Chief Complaint   Patient presents with    R/o Preeclampsia      Pt is  38w5d intra-uterine pregnancy.  History obtained, consents signed. Oriented to room, staff, and plan of care.  Pt states she had called office this afternoon after feeling \"off\" today and she had a nurse take her blood pressure and it was 160(?)/ 90(?).  Pt described \"off\" as feelings of heart racing and SOB.   Pt currently not having SOB.  Non pitting edema noted to lower extremities, reflexes 2+. No clonus noted.

## 2025-02-06 NOTE — TELEPHONE ENCOUNTER
Pt called reporting that she was at work was not feeling well (elevated heart rate and anxiety along with some lower abdominal tightening) and that the nurse at work took her B/P reading 164/94.   The above information was passed on to Dr. Yun who instructed for pt to go to [facility]. Pt was informed and pt verbalized understanding  that she needs to go to [facility] now.

## 2025-02-07 ENCOUNTER — TELEPHONE (OUTPATIENT)
Facility: CLINIC | Age: 35
End: 2025-02-07

## 2025-02-07 PROBLEM — O13.3 PREGNANCY-INDUCED HYPERTENSION IN THIRD TRIMESTER (HCC): Status: ACTIVE | Noted: 2025-02-07

## 2025-02-07 NOTE — DISCHARGE INSTRUCTIONS
Discharge Instructions    Diet: Regular  Activity: Bedrest;May be up to bathroom         General Instructions    Call your OB doctor if: Fluid leaking from your vagina;Uterine contractions increasing in intensity and frequency;Vaginal bleeding;Vaginal or rectal pressure;Uterine contractions 10 minutes or closer for 1 to 2 hours;Decrease in fetal movement;Temperature greater than 100F  Early labor comfort measures: Relax, sleep, take a warm bath or shower for 30 minutes or less;Drink fluids and eat small light meals;Take a walk

## 2025-02-07 NOTE — PROGRESS NOTES
Discharged to home per ambulatory in stable condition with written and verbal instructions. Patient verbalizes understanding of information given.     Signs and symptoms of PIH reviewed c pt.  Pt to follow up with MD on Saturday for blood pressure check.

## 2025-02-07 NOTE — NST
Nonstress Test   Patient: Cady Garcia    Gestation: 38w5d    NST:       Variability: Moderate           Accelerations: Yes           Decelerations: None            Baseline 130           Uterine Irritability: No           Contractions: Not present                                        Acoustic Stimulator: No           Nonstress Test Interpretation: Reactive                                 Additional Comments  agree with above

## 2025-02-07 NOTE — PROGRESS NOTES
Late entry     I was notified by the RN in triage that the patient presented to triage with complaint of elevated blood pressures.  Patient is a 34-year-old G1, P0 at 38 weeks and 5 days without prior gestational hypertension or chronic hypertension complications in this pregnancy.  The patient receives her primary OBGYN care by Dr. Yun. I was the physician assigned for call coverage for Dr. Yun today. Per RN, Dr. Pulido requested for the nurse to defer further patient evaluation and management to myself after he provided notification that the patient will be presenting for elevated blood pressures. Per RN, the patient reported elevated blood pressures that were noted at home and at work.  She contacted the office on 2/6/2025 and was advised to report to OB triage for further evaluation.    I recommended for the patient to remain in OB triage for further management.  There was a concern for gestational hypertension versus preeclampsia with or without severe features.  Laboratory testing performed.  Labs were reviewed.  No evidence of preeclampsia with severe features were noted on the laboratory testing.  However, the patient continued to have mild range elevated blood pressures.  Therefore, I advised for the patient to remain in OB triage for approximately 4 hours or more if indicated for blood pressure monitoring.  I was concerned that the patient has newly developed gestational hypertension and recommended for the patient to be thoroughly evaluated.    However, I was subsequently notified by the RN that Dr. Yun resumed care for this patient.  Therefore, patient was discharged to home per Dr. Yun.  The patient had left the hospital prior to when I was made aware that she was discharged home.  My assigned call coverage for Dr. Yun was then stopped at 7:44 pm on 02/06/25.     Ashley Álvarez MD   [facility] [place] [facility]

## 2025-02-07 NOTE — TELEPHONE ENCOUNTER
Called to check on    Doing well without complaints  BP less than 140/90    Taking it easy    Keep appt tomorrow  Discussed the eventual IOL soon.    Court  2/7/25  10:30 AM

## 2025-02-08 ENCOUNTER — ANESTHESIA (OUTPATIENT)
Dept: OBGYN UNIT | Facility: HOSPITAL | Age: 35
End: 2025-02-08
Payer: COMMERCIAL

## 2025-02-08 ENCOUNTER — ROUTINE PRENATAL (OUTPATIENT)
Facility: CLINIC | Age: 35
End: 2025-02-08
Payer: COMMERCIAL

## 2025-02-08 ENCOUNTER — ANESTHESIA EVENT (OUTPATIENT)
Dept: OBGYN UNIT | Facility: HOSPITAL | Age: 35
End: 2025-02-08
Payer: COMMERCIAL

## 2025-02-08 ENCOUNTER — HOSPITAL ENCOUNTER (INPATIENT)
Facility: HOSPITAL | Age: 35
LOS: 2 days | Discharge: HOME | End: 2025-02-10
Attending: OBSTETRICS & GYNECOLOGY | Admitting: OBSTETRICS & GYNECOLOGY
Payer: COMMERCIAL

## 2025-02-08 VITALS — WEIGHT: 196 LBS | SYSTOLIC BLOOD PRESSURE: 140 MMHG | BODY MASS INDEX: 32 KG/M2 | DIASTOLIC BLOOD PRESSURE: 102 MMHG

## 2025-02-08 DIAGNOSIS — Z34.03 ENCOUNTER FOR SUPERVISION OF NORMAL FIRST PREGNANCY IN THIRD TRIMESTER (HCC): Primary | ICD-10-CM

## 2025-02-08 PROBLEM — O13.3: Status: ACTIVE | Noted: 2025-02-07

## 2025-02-08 PROBLEM — Z34.90 PREGNANT (HCC): Status: ACTIVE | Noted: 2025-02-08

## 2025-02-08 LAB
ALBUMIN SERPL-MCNC: 4 G/DL (ref 3.2–4.8)
ALBUMIN/GLOB SERPL: 1.6 {RATIO} (ref 1–2)
ALP LIVER SERPL-CCNC: 398 U/L
ALT SERPL-CCNC: 27 U/L
ANION GAP SERPL CALC-SCNC: 12 MMOL/L (ref 0–18)
ANTIBODY SCREEN: NEGATIVE
AST SERPL-CCNC: 31 U/L (ref ?–34)
BASOPHILS # BLD AUTO: 0.05 X10(3) UL (ref 0–0.2)
BASOPHILS NFR BLD AUTO: 0.7 %
BILIRUB SERPL-MCNC: 0.2 MG/DL (ref 0.3–1.2)
BUN BLD-MCNC: 14 MG/DL (ref 9–23)
CALCIUM BLD-MCNC: 9 MG/DL (ref 8.7–10.6)
CHLORIDE SERPL-SCNC: 104 MMOL/L (ref 98–112)
CO2 SERPL-SCNC: 21 MMOL/L (ref 21–32)
CREAT BLD-MCNC: 0.89 MG/DL
CREAT UR-SCNC: 206.9 MG/DL
EGFRCR SERPLBLD CKD-EPI 2021: 88 ML/MIN/1.73M2 (ref 60–?)
EOSINOPHIL # BLD AUTO: 0.12 X10(3) UL (ref 0–0.7)
EOSINOPHIL NFR BLD AUTO: 1.6 %
ERYTHROCYTE [DISTWIDTH] IN BLOOD BY AUTOMATED COUNT: 13.2 %
GLOBULIN PLAS-MCNC: 2.5 G/DL (ref 2–3.5)
GLUCOSE (URINE DIPSTICK): NEGATIVE MG/DL
GLUCOSE BLD-MCNC: 80 MG/DL (ref 70–99)
HCT VFR BLD AUTO: 38.6 %
HGB BLD-MCNC: 13.3 G/DL
IMM GRANULOCYTES # BLD AUTO: 0.04 X10(3) UL (ref 0–1)
IMM GRANULOCYTES NFR BLD: 0.5 %
LYMPHOCYTES # BLD AUTO: 3.24 X10(3) UL (ref 1–4)
LYMPHOCYTES NFR BLD AUTO: 43.7 %
MCH RBC QN AUTO: 30.1 PG (ref 26–34)
MCHC RBC AUTO-ENTMCNC: 34.5 G/DL (ref 31–37)
MCV RBC AUTO: 87.3 FL
MONOCYTES # BLD AUTO: 0.36 X10(3) UL (ref 0.1–1)
MONOCYTES NFR BLD AUTO: 4.9 %
NEUTROPHILS # BLD AUTO: 3.61 X10 (3) UL (ref 1.5–7.7)
NEUTROPHILS # BLD AUTO: 3.61 X10(3) UL (ref 1.5–7.7)
NEUTROPHILS NFR BLD AUTO: 48.6 %
NITRITE, URINE: NEGATIVE
OSMOLALITY SERPL CALC.SUM OF ELEC: 283 MOSM/KG (ref 275–295)
PLATELET # BLD AUTO: 153 10(3)UL (ref 150–450)
POTASSIUM SERPL-SCNC: 4.1 MMOL/L (ref 3.5–5.1)
PROT SERPL-MCNC: 6.5 G/DL (ref 5.7–8.2)
PROT UR-MCNC: 25.4 MG/DL (ref ?–14)
PROT/CREAT UR-RTO: 0.12
PROTEIN (URINE DIPSTICK): 100 MG/DL
RBC # BLD AUTO: 4.42 X10(6)UL
RH BLOOD TYPE: POSITIVE
SODIUM SERPL-SCNC: 137 MMOL/L (ref 136–145)
T PALLIDUM AB SER QL IA: NONREACTIVE
URATE SERPL-MCNC: 7.3 MG/DL
WBC # BLD AUTO: 7.4 X10(3) UL (ref 4–11)

## 2025-02-08 PROCEDURE — 81002 URINALYSIS NONAUTO W/O SCOPE: CPT | Performed by: OBSTETRICS & GYNECOLOGY

## 2025-02-08 PROCEDURE — 10907ZC DRAINAGE OF AMNIOTIC FLUID, THERAPEUTIC FROM PRODUCTS OF CONCEPTION, VIA NATURAL OR ARTIFICIAL OPENING: ICD-10-PCS | Performed by: OBSTETRICS & GYNECOLOGY

## 2025-02-08 PROCEDURE — 3E033VJ INTRODUCTION OF OTHER HORMONE INTO PERIPHERAL VEIN, PERCUTANEOUS APPROACH: ICD-10-PCS | Performed by: OBSTETRICS & GYNECOLOGY

## 2025-02-08 PROCEDURE — 3080F DIAST BP >= 90 MM HG: CPT | Performed by: OBSTETRICS & GYNECOLOGY

## 2025-02-08 PROCEDURE — 3077F SYST BP >= 140 MM HG: CPT | Performed by: OBSTETRICS & GYNECOLOGY

## 2025-02-08 PROCEDURE — 3E0P7VZ INTRODUCTION OF HORMONE INTO FEMALE REPRODUCTIVE, VIA NATURAL OR ARTIFICIAL OPENING: ICD-10-PCS | Performed by: OBSTETRICS & GYNECOLOGY

## 2025-02-08 PROCEDURE — 0KQM0ZZ REPAIR PERINEUM MUSCLE, OPEN APPROACH: ICD-10-PCS | Performed by: OBSTETRICS & GYNECOLOGY

## 2025-02-08 PROCEDURE — 59400 OBSTETRICAL CARE: CPT | Performed by: OBSTETRICS & GYNECOLOGY

## 2025-02-08 RX ORDER — DEXTROSE, SODIUM CHLORIDE, SODIUM LACTATE, POTASSIUM CHLORIDE, AND CALCIUM CHLORIDE 5; .6; .31; .03; .02 G/100ML; G/100ML; G/100ML; G/100ML; G/100ML
INJECTION, SOLUTION INTRAVENOUS AS NEEDED
Status: DISCONTINUED | OUTPATIENT
Start: 2025-02-08 | End: 2025-02-08 | Stop reason: HOSPADM

## 2025-02-08 RX ORDER — ACETAMINOPHEN 500 MG
1000 TABLET ORAL EVERY 6 HOURS PRN
Status: DISCONTINUED | OUTPATIENT
Start: 2025-02-08 | End: 2025-02-08

## 2025-02-08 RX ORDER — ACETAMINOPHEN 500 MG
1000 TABLET ORAL EVERY 6 HOURS PRN
Status: DISCONTINUED | OUTPATIENT
Start: 2025-02-08 | End: 2025-02-10

## 2025-02-08 RX ORDER — ACETAMINOPHEN 500 MG
500 TABLET ORAL EVERY 6 HOURS PRN
Status: DISCONTINUED | OUTPATIENT
Start: 2025-02-08 | End: 2025-02-10

## 2025-02-08 RX ORDER — IBUPROFEN 600 MG/1
600 TABLET, FILM COATED ORAL EVERY 6 HOURS
Status: DISCONTINUED | OUTPATIENT
Start: 2025-02-08 | End: 2025-02-10

## 2025-02-08 RX ORDER — CITRIC ACID/SODIUM CITRATE 334-500MG
30 SOLUTION, ORAL ORAL AS NEEDED
Status: DISCONTINUED | OUTPATIENT
Start: 2025-02-08 | End: 2025-02-08 | Stop reason: HOSPADM

## 2025-02-08 RX ORDER — MISOPROSTOL 200 UG/1
1000 TABLET ORAL ONCE AS NEEDED
Status: ACTIVE | OUTPATIENT
Start: 2025-02-08 | End: 2025-02-08

## 2025-02-08 RX ORDER — BUPIVACAINE HYDROCHLORIDE 2.5 MG/ML
30 INJECTION, SOLUTION EPIDURAL; INFILTRATION; INTRACAUDAL AS NEEDED
Status: DISCONTINUED | OUTPATIENT
Start: 2025-02-08 | End: 2025-02-09

## 2025-02-08 RX ORDER — BUPIVACAINE HCL/0.9 % NACL/PF 0.25 %
5 PLASTIC BAG, INJECTION (ML) EPIDURAL AS NEEDED
Status: DISCONTINUED | OUTPATIENT
Start: 2025-02-08 | End: 2025-02-09

## 2025-02-08 RX ORDER — LIDOCAINE HYDROCHLORIDE AND EPINEPHRINE 15; 5 MG/ML; UG/ML
5 INJECTION, SOLUTION EPIDURAL AS NEEDED
Status: DISCONTINUED | OUTPATIENT
Start: 2025-02-08 | End: 2025-02-09

## 2025-02-08 RX ORDER — AMMONIA 15 % (W/V)
0.3 AMPUL (EA) INHALATION AS NEEDED
Status: DISCONTINUED | OUTPATIENT
Start: 2025-02-08 | End: 2025-02-10

## 2025-02-08 RX ORDER — ACETAMINOPHEN 500 MG
500 TABLET ORAL EVERY 6 HOURS PRN
Status: DISCONTINUED | OUTPATIENT
Start: 2025-02-08 | End: 2025-02-08

## 2025-02-08 RX ORDER — LIDOCAINE HYDROCHLORIDE 20 MG/ML
5 INJECTION, SOLUTION EPIDURAL; INFILTRATION; INTRACAUDAL; PERINEURAL AS NEEDED
Status: DISCONTINUED | OUTPATIENT
Start: 2025-02-08 | End: 2025-02-09

## 2025-02-08 RX ORDER — SODIUM CHLORIDE 9 MG/ML
10 INJECTION, SOLUTION INTRAMUSCULAR; INTRAVENOUS; SUBCUTANEOUS AS NEEDED
Status: DISCONTINUED | OUTPATIENT
Start: 2025-02-08 | End: 2025-02-09

## 2025-02-08 RX ORDER — NALBUPHINE HYDROCHLORIDE 10 MG/ML
2.5 INJECTION INTRAMUSCULAR; INTRAVENOUS; SUBCUTANEOUS
Status: DISCONTINUED | OUTPATIENT
Start: 2025-02-08 | End: 2025-02-09

## 2025-02-08 RX ORDER — DOCUSATE SODIUM 100 MG/1
100 CAPSULE, LIQUID FILLED ORAL 2 TIMES DAILY
Status: DISCONTINUED | OUTPATIENT
Start: 2025-02-08 | End: 2025-02-10

## 2025-02-08 RX ORDER — TERBUTALINE SULFATE 1 MG/ML
0.25 INJECTION SUBCUTANEOUS AS NEEDED
Status: DISCONTINUED | OUTPATIENT
Start: 2025-02-08 | End: 2025-02-08 | Stop reason: HOSPADM

## 2025-02-08 RX ORDER — SODIUM CHLORIDE, SODIUM LACTATE, POTASSIUM CHLORIDE, CALCIUM CHLORIDE 600; 310; 30; 20 MG/100ML; MG/100ML; MG/100ML; MG/100ML
INJECTION, SOLUTION INTRAVENOUS CONTINUOUS
Status: DISCONTINUED | OUTPATIENT
Start: 2025-02-08 | End: 2025-02-08 | Stop reason: HOSPADM

## 2025-02-08 RX ORDER — LIDOCAINE HYDROCHLORIDE AND EPINEPHRINE 15; 5 MG/ML; UG/ML
INJECTION, SOLUTION EPIDURAL AS NEEDED
Status: DISCONTINUED | OUTPATIENT
Start: 2025-02-08 | End: 2025-02-08 | Stop reason: SURG

## 2025-02-08 RX ORDER — BISACODYL 10 MG
10 SUPPOSITORY, RECTAL RECTAL ONCE AS NEEDED
Status: DISCONTINUED | OUTPATIENT
Start: 2025-02-08 | End: 2025-02-10

## 2025-02-08 RX ORDER — IBUPROFEN 600 MG/1
600 TABLET, FILM COATED ORAL ONCE AS NEEDED
Status: DISCONTINUED | OUTPATIENT
Start: 2025-02-08 | End: 2025-02-08

## 2025-02-08 RX ORDER — SIMETHICONE 80 MG
80 TABLET,CHEWABLE ORAL 3 TIMES DAILY PRN
Status: DISCONTINUED | OUTPATIENT
Start: 2025-02-08 | End: 2025-02-10

## 2025-02-08 RX ORDER — ONDANSETRON 2 MG/ML
4 INJECTION INTRAMUSCULAR; INTRAVENOUS EVERY 6 HOURS PRN
Status: DISCONTINUED | OUTPATIENT
Start: 2025-02-08 | End: 2025-02-08 | Stop reason: HOSPADM

## 2025-02-08 RX ADMIN — LIDOCAINE HYDROCHLORIDE AND EPINEPHRINE 3 ML: 15; 5 INJECTION, SOLUTION EPIDURAL at 17:27:00

## 2025-02-08 RX ADMIN — LIDOCAINE HYDROCHLORIDE AND EPINEPHRINE 2 ML: 15; 5 INJECTION, SOLUTION EPIDURAL at 17:30:00

## 2025-02-08 NOTE — ANESTHESIA PREPROCEDURE EVALUATION
PRE-OP EVALUATION    Patient Name: Cady Garcia    Admit Diagnosis: pregnancy  Pregnant (HCC)    Pre-op Diagnosis: * No surgery found *      Term pregnancy in painful labor with requested analgesia.    Anesthesia Procedure: LABOR ANALGESIA    * Surgery not found *    Pre-op vitals reviewed.  Temp: 98.3 °F (36.8 °C)  Pulse: 78  Resp: 18  BP: 128/88     Body mass index is 31.65 kg/m².    Current medications reviewed.  Hospital Medications:   lactated ringers infusion   Intravenous Continuous    dextrose in lactated ringers 5% infusion   Intravenous PRN    lactated ringers IV bolus 500 mL  500 mL Intravenous PRN    acetaminophen (Tylenol Extra Strength) tab 500 mg  500 mg Oral Q6H PRN    acetaminophen (Tylenol Extra Strength) tab 1,000 mg  1,000 mg Oral Q6H PRN    ondansetron (Zofran) 4 MG/2ML injection 4 mg  4 mg Intravenous Q6H PRN    oxyTOCIN in sodium chloride 0.9% (Pitocin) 30 Units/500mL infusion premix  62.5-900 lizzie-units/min Intravenous Continuous    terbutaline (Brethine) 1 MG/ML injection 0.25 mg  0.25 mg Subcutaneous PRN    sodium citrate-citric acid (Bicitra) 500-334 MG/5ML oral solution 30 mL  30 mL Oral PRN    oxyTOCIN in sodium chloride 0.9% (Pitocin) 30 Units/500mL infusion premix  0.5-20 lizzie-units/min Intravenous Continuous    [COMPLETED] misoprostol (CYTOTEC) partial tablets 25 mcg  25 mcg Vaginal Once    lactated ringers IV bolus 1,000 mL  1,000 mL Intravenous Once    fentaNYL-bupivacaine 2 mcg/mL-0.125% in sodium chloride 0.9% 100 mL EPIDURAL infusion premix  12 mL/hr Epidural Continuous    fentaNYL (Sublimaze) 50 mcg/mL injection 100 mcg  100 mcg Epidural Once    lidocaine 1.5%-EPINEPHrine 1:200,000 (Xylocaine-Epinephrine) injection  5 mL Injection PRN    bupivacaine PF (Marcaine) 0.25% injection  30 mL Injection PRN    lidocaine PF (Xylocaine-MPF) 2% injection  5 mL Injection PRN    sodium chloride 0.9% PF injection 10 mL  10 mL Injection PRN    ePHEDrine (PF) 25 MG/5 ML injection 5  mg  5 mg Intravenous PRN    nalbuphine (Nubain) 10 mg/mL injection 2.5 mg  2.5 mg Intravenous Q15 Min PRN       Outpatient Medications:   Prescriptions Prior to Admission[1]    Allergies: Amoxicillin and Sulfa antibiotics      Anesthesia Evaluation    Patient summary reviewed.    Anesthetic Complications  (-) history of anesthetic complications         GI/Hepatic/Renal                                 Cardiovascular        Exercise tolerance: good     MET: >4      (+) hypertension   (+) hyperlipidemia                                  Endo/Other      (+) diabetes  gestational,   (+) hypothyroidism                       Pulmonary                           Neuro/Psych                              Patient Active Problem List:     Hypothyroidism     Pure hypercholesterolemia     Multiple thyroid nodules     CHON (generalized anxiety disorder)     Family history of diabetes mellitus     Environmental allergies     PCB (post coital bleeding)     Diet controlled gestational diabetes mellitus (GDM) in third trimester (HCC) - class A 1     Pregnancy-induced hypertension in third trimester (HCC)     Pregnant (HCC)            Past Surgical History:   Procedure Laterality Date    Gastric bypass,obese<100cm nicole-en-y  2022    Other surgical history  gastric bypass and breast augmentation     Social History     Socioeconomic History    Marital status:    Tobacco Use    Smoking status: Never     Passive exposure: Past    Smokeless tobacco: Never   Vaping Use    Vaping status: Never Used   Substance and Sexual Activity    Alcohol use: Yes     Alcohol/week: 1.0 standard drink of alcohol     Types: 1 Standard drinks or equivalent per week     Comment: rarely    Drug use: Never    Sexual activity: Yes     Partners: Male     Comment: trying to conceive   Other Topics Concern    Caffeine Concern No    Exercise No    Seat Belt No    Special Diet No    Stress Concern Yes     Comment: More anxiety    Weight Concern Yes     Comment:  Gaining weight     History   Drug Use Unknown     Available pre-op labs reviewed.  Lab Results   Component Value Date    WBC 7.4 02/08/2025    RBC 4.42 02/08/2025    HGB 13.3 02/08/2025    HCT 38.6 02/08/2025    MCV 87.3 02/08/2025    MCH 30.1 02/08/2025    MCHC 34.5 02/08/2025    RDW 13.2 02/08/2025    .0 02/08/2025     Lab Results   Component Value Date     02/08/2025    K 4.1 02/08/2025     02/08/2025    CO2 21.0 02/08/2025    BUN 14 02/08/2025    CREATSERUM 0.89 02/08/2025    GLU 80 02/08/2025    CA 9.0 02/08/2025            Airway      Mallampati: II  Mouth opening: >3 FB  TM distance: 4 - 6 cm  Neck ROM: full Cardiovascular      Rhythm: regular  Rate: normal     Dental    Dentition appears grossly intact         Pulmonary      Breath sounds clear to auscultation bilaterally.               Other findings              ASA: 2   Plan: epidural             Plan/risks discussed with: patient                Present on Admission:  **None**             [1]   Medications Prior to Admission   Medication Sig Dispense Refill Last Dose/Taking    escitalopram 5 MG Oral Tab Take 1 tablet (5 mg total) by mouth daily. 90 tablet 0 2/7/2025 at  9:30 PM    prenatal vitamin with DHA 27-0.8-228 MG Oral Cap Take 1 capsule by mouth daily.   2/7/2025 at  9:30 PM    acetaminophen 500 MG Oral Tab Take 1 tablet (500 mg total) by mouth every 6 (six) hours as needed for Pain.   2/7/2025    Multiple Vitamin (ONE-DAILY MULTI VITAMINS) Oral Tab Take 1 tablet by mouth daily.   Past Week    Blood Glucose Monitoring Suppl (ONETOUCH VERIO FLEX SYSTEM) w/Device Does not apply Kit 1 each As Directed. 1 kit 0     Glucose Blood (ONETOUCH VERIO) In Vitro Strip Check blood sugar 4 times daily 150 strip 3     OneTouch Delica Lancets 30G Does not apply Misc 1 each 4 (four) times daily. 200 each 3     Loratadine 5 MG Oral Chew Tab Chew 1 tablet (5 mg total) by mouth daily.   More than a month

## 2025-02-08 NOTE — PROGRESS NOTES
Pt is a 34 year old female admitted to 115/115-A.     Chief Complaint  Patient presents with   Un-scheduled induction   Pt is  39w0d intra-uterine pregnancy.  History obtained, consents signed. Oriented to room, staff, and plan of care.

## 2025-02-08 NOTE — PROGRESS NOTES
L&D    First dose of Cytotec done  Irregular contractions but patient very comfortable    VSS Afebrile    VE: 2-3/70/-2 AROM - clear    A:  PIH only no preeclampsia  P:  Start Pitocin induction    Yun  2/8/25  1444

## 2025-02-08 NOTE — ANESTHESIA PROCEDURE NOTES
Labor Analgesia    Date/Time: 2/8/2025 5:11 PM    Performed by: Faheem Martinez MD  Authorized by: Faheem Martinez MD      General Information and Staff    Start Time:  2/8/2025 5:11 PM  End Time:  2/8/2025 5:37 PM  Anesthesiologist:  Faheem Martinez MD  Performed by:  Anesthesiologist  Patient Location:  OB  Site Identification: surface landmarks    Reason for Block: labor epidural    Preanesthetic Checklist: patient identified, IV checked, risks and benefits discussed, monitors and equipment checked, pre-op evaluation, timeout performed, IV bolus, anesthesia consent and sterile technique used      Procedure Details    Patient Position:  Sitting  Prep: ChloraPrep    Monitoring:  Heart rate and continuous pulse ox  Approach:  Midline    Epidural Needle    Injection Technique:  YASMANI air  Needle Type:  Tuohy  Needle Gauge:  17 G  Needle Length:  3.375 in  Needle Insertion Depth:  7.5  Location:  L3-4    Spinal Needle      Catheter    Catheter Type:  End hole  Catheter Size:  19 G  Catheter at Skin Depth:  12.5  Test Dose:  Negative    Assessment    Sensory Level:  T8    Additional Comments       Test dose 3cc + 2cc + Fentanyl 100mcg via epidural catheter.  Infusion started at 12cc/hr

## 2025-02-08 NOTE — PROGRESS NOTES
No severe preeclamptic symptoms  Feels well  Trace edema, DTR's 2/4 knee, 0/4 BR  Protein 100  To L&D for IOL - PIH versus mild preeclampsia  Cytotec - > pitocin

## 2025-02-08 NOTE — H&P
[facility]  History & Physical - done in office    Cady Garcia Patient Status:  Inpatient    1990 MRN [id number]   Location [facility] LABOR & DELIVERY Attending Jeyson Yun MD   Hosp Day # 0 PCP Lucía Chun,      Subjective:  Cady Garcia is a 34 year old white female  LMP:  24 with EDC: 2/15/25 at 39 and 0/7 weeks gestation who is being admitted for induction of labor for PIH.  Trieaged several times over the last few days for rule out preeclampsia.  Had 100 protein today in office so sent for induction. Her current obstetrical history is significant for  borderline blood pressures over the last couple of weeks treated with modified bedrest .  Patient reports no complaints.   Fetal Movement: normal.     PNC: issues:  See epic pregnancy record.    OB Hx:  See epic pregnancy record.           Objective:     Vital signs in last 24 hours:  Temp:  [98.6 °F (37 °C)] 98.6 °F (37 °C)  Resp:  [18] 18  BP: (130-140)/() 140/102      General:  Alert and oriented.  Tolerating labor well   Skin:   normal   HEENT:  PERRLA   Lungs:   clear   Heart:   S1, S2 normal, no click, rub or gallop, regular rate and rhythm, with a physiologic 2/6 CARLOS ENRIQUE flow murmur.   Breasts:   normal without suspicious masses, skin or nipple changes or axillary nodes.   Abdomen:  Uterus:  normal findings: soft, non-tender with gravid uterus  size equals dates, EFW:3100 grams.   Presentations: cephalic   Pelvis: External genitalia: normal general appearance   Cervix:     Dilation: 2cm    Effacement: 60    Station:  -2    Consistency: soft    Position: posterior     Exam - done in the office today    Extremeties DTR's normal (2/4), no DVT (negative Dae's sign),  Trace edema       FHT:  135 BPM, Moderate variability, accelerations are present, no decelerations.  Reassuring i.e. category 1.  Spontaneous contractions every 3-5 minutes lasting 30 seconds.     Lab Review  Recent Labs   Lab  02/06/25  1538 02/08/25  0917   RBC 4.07 4.42   HGB 12.4 13.3   HCT 35.6 38.6   MCV 87.5 87.3   MCH 30.5 30.1   MCHC 34.8 34.5   RDW 13.3 13.2   NEPRELIM 3.59 3.61   WBC 7.8 7.4   .0 153.0     Recent Labs   Lab 02/06/25  1538 02/08/25  0917   * 80   BUN 12 14   CREATSERUM 0.91 0.89   EGFRCR 86 88   CA 9.0 9.0   ALB 3.7 4.0   * 137   K 3.6 4.1    104   CO2 22.0 21.0   ALKPHO 368* 398*   AST 31 31   ALT 25 27   BILT 0.2* 0.2*   TP 6.3 6.5     Uric acid normal  Protein / Cr ratio normal again.     See Prenatal Record and labor summary    Pertinent Risk Factors:  HTN    Assessment/Plan:    IUP at 39 weeks  Pregnancy induced hypertension - for induction of labor         Risks, benefits, alternatives and possible complications have been discussed in detail with the patient.  Pre-admission, admission, and post admission procedures and expectations were discussed in detail.  All questions answered, all appropriate consents will be signed at the Hospital. Admission is planned for today.   Intervention: Cytotec X 1 then pitocin and AROM.       Jeyson Yun MD  2/8/2025  10:16 AM

## 2025-02-09 LAB
BASOPHILS # BLD AUTO: 0.03 X10(3) UL (ref 0–0.2)
BASOPHILS NFR BLD AUTO: 0.2 %
EOSINOPHIL # BLD AUTO: 0.01 X10(3) UL (ref 0–0.7)
EOSINOPHIL NFR BLD AUTO: 0.1 %
ERYTHROCYTE [DISTWIDTH] IN BLOOD BY AUTOMATED COUNT: 13.5 %
HCT VFR BLD AUTO: 35.6 %
HGB BLD-MCNC: 12.5 G/DL
IMM GRANULOCYTES # BLD AUTO: 0.08 X10(3) UL (ref 0–1)
IMM GRANULOCYTES NFR BLD: 0.7 %
LYMPHOCYTES # BLD AUTO: 3.22 X10(3) UL (ref 1–4)
LYMPHOCYTES NFR BLD AUTO: 26.2 %
MCH RBC QN AUTO: 31.2 PG (ref 26–34)
MCHC RBC AUTO-ENTMCNC: 35.1 G/DL (ref 31–37)
MCV RBC AUTO: 88.8 FL
MONOCYTES # BLD AUTO: 0.57 X10(3) UL (ref 0.1–1)
MONOCYTES NFR BLD AUTO: 4.6 %
NEUTROPHILS # BLD AUTO: 8.39 X10 (3) UL (ref 1.5–7.7)
NEUTROPHILS # BLD AUTO: 8.39 X10(3) UL (ref 1.5–7.7)
NEUTROPHILS NFR BLD AUTO: 68.2 %
PLATELET # BLD AUTO: 131 10(3)UL (ref 150–450)
RBC # BLD AUTO: 4.01 X10(6)UL
WBC # BLD AUTO: 12.3 X10(3) UL (ref 4–11)

## 2025-02-09 RX ORDER — ESCITALOPRAM OXALATE 5 MG/1
5 TABLET ORAL NIGHTLY
Status: DISCONTINUED | OUTPATIENT
Start: 2025-02-09 | End: 2025-02-10

## 2025-02-09 RX ORDER — CALCIUM CARBONATE 500 MG/1
500 TABLET, CHEWABLE ORAL 3 TIMES DAILY PRN
Status: DISCONTINUED | OUTPATIENT
Start: 2025-02-09 | End: 2025-02-10

## 2025-02-09 RX ORDER — LABETALOL 100 MG/1
100 TABLET, FILM COATED ORAL 2 TIMES DAILY
Status: DISCONTINUED | OUTPATIENT
Start: 2025-02-09 | End: 2025-02-10

## 2025-02-09 NOTE — PROGRESS NOTES
Bedside report and patient received. Patient lying in bed on right side with EFM applied, color pink/skin warm and dry.  IV fluids infusing: LR @ 125 ml/hr via 20 g angio in left wrist. No pain/redness/swelling noted at site. Abdomen palpates soft and non-tender, denies pain or discomfort with palpation. Epidural anesthesia infusing at 12 ml/hr on infusion pump. Bed in low locked position, siderails up x2, call light within patient reach.

## 2025-02-09 NOTE — L&D DELIVERY NOTE
Jose, Girl [[id number]]      Labor Events     labor?: No   steroids?: None  Antibiotics received during labor?: No  Rupture date/time: 2025 1430     Rupture type: AROM  Fluid color: Clear  Labor type: Induced Onset of Labor  Induction: Misoprostol, Oxytocin  Indications for induction: Preeclampsia       Labor Event Times    Labor onset date/time: 2025  Dilation complete date/time: 2025  Start pushing date/time: 2025       Curtis Presentation    No data filed       Operative Delivery    No data filed       Shoulder Dystocia    No data filed       Anesthesia    Method: Epidural               Delivery      Head delivery date/time: 2025 20:10:19   Delivery date/time:  25 20:10:44       Details:            Delivery Providers    Delivering Clinician: Jeyson Yun MD   Delivery personnel:  Provider Role    Baby Nurse    Delivery Nurse             Cord    No data filed        Measurements      Weight: 3150 g 6 lb 15.1 oz Length: 52.1 cm     Head circum.: 33.5 cm Chest circum.: 32 cm      Abdominal circum.: 32 cm           Placenta    Date/time: 2025  Removal: Spontaneous  Appearance: Intact  Disposition: held for future pathology       Apgars    Living status: Living   Apgar Scoring Key:    0 1 2    Skin color Blue or pale Acrocyanotic Completely pink    Heart rate Absent <100 bpm >100 bpm    Reflex irritability No response Grimace Cry or active withdrawal    Muscle tone Limp Some flexion Active motion    Respiratory effort Absent Weak cry; hypoventilation Good, crying              1 Minute:  5 Minute:  10 Minute:  15 Minute:  20 Minute:      Skin color:        Heart rate:        Reflex irritablity:        Muscle tone:        Respiratory effort:        Total:                Skin to Skin    No data filed       Vaginal Count    No data filed       Lacerations    No data filed                                                                     Vaginal Delivery Note          Cady Garcia Patient Status:  Inpatient    1990 MRN [id number]   [facility] LABOR & DELIVERY Attending Jeyson Yun MD   Hosp Day # 0 PCP Lucía Chun DO       Pre Op Dx:  IUP at 39 weeks in labor         Pregnancy induced Hypertension - induction    Post Op Dx: Same - delivered    Op: Normal Spontaneous Vaginal Delivery    Surgeon:  Court       Anesthesia: Epidural    EBL: 450  cc's    Indications:  See Delivery Summary    Findings:    Head: OA, Sex: Female    Weight: 6# 15 oz     Apgars:  8/9    Shoulders:  normal   Nuchal: none Placenta: Intact with 3 vessels  Unique findings: none       Procedure:  The patients was placed in the dorsolithotomy position and prepped.  She was encouraged to push.  As the head was delivered the legs were lowered and the perineum was supported to decrease the risk of tearing.  The infants nose and mouth were bulb suctioned.  The shoulders rotated easily.  The infant was dried and suctioned. The baby was placed on the mothers abdomen at her request.  The cord was doubly clamped and cut after 30 seconds.   The cord blood was sampled.  IV pitocin and gentle uterine massage helped delivery of the placenta intact with 3 vessels.  Umbilical cord gases were not sent.     The second degree perineal laceration repaired in layers.  The vaginal portion was approximated with a running locking  2.0 rapide vicryl. A crown stitch was placed and tied.  The perineum was approximated with one 2.0 rapide vicryl suture. The skin was approximated with 3.0 rapide vicryl in a interrupted subcuticular fashion.  Rectal-vaginal exam was normal.     Direct inspection of the cervix showed no lacerations despite the quick labor.  Bleeding was minimal.  The patient was then moved to the supine position in stable condition.  Counts were correct.    Complications:  None    Mother and infant in good condition.    Jeyson NGO  MD Court  2/8/2025  8:30 PM

## 2025-02-09 NOTE — PROGRESS NOTES
NURSING ADMISSION NOTE      Patient admitted via wheelchair  ID bands verified with labor RN.  Oriented to room, care of plan reviewed and patient education discussed.  Safety precautions initiated.  Bed in low position.  Call light in reach.

## 2025-02-09 NOTE — PROGRESS NOTES
Pt transferred to Mother Baby room 1118 in stable condition. Report given to Jen HUGO.  Infant transferred with mother in stable condition.

## 2025-02-09 NOTE — PLAN OF CARE
Problem: BIRTH - VAGINAL/ SECTION  Goal: Fetal and maternal status remain reassuring during the birth process  Description: INTERVENTIONS:  - Monitor vital signs  - Monitor fetal heart rate  - Monitor uterine activity  - Monitor labor progression (vaginal delivery)  - DVT prophylaxis (C/S delivery)  - Surgical antibiotic prophylaxis (C/S delivery)  Outcome: Completed     Problem: PAIN - ADULT  Goal: Verbalizes/displays adequate comfort level or patient's stated pain goal  Description: INTERVENTIONS:  - Encourage pt to monitor pain and request assistance  - Assess pain using appropriate pain scale  - Administer analgesics based on type and severity of pain and evaluate response  - Implement non-pharmacological measures as appropriate and evaluate response  - Consider cultural and social influences on pain and pain management  - Manage/alleviate anxiety  - Utilize distraction and/or relaxation techniques  - Monitor for opioid side effects  - Notify MD/LIP if interventions unsuccessful or patient reports new pain  - Anticipate increased pain with activity and pre-medicate as appropriate  Outcome: Completed     Problem: ANXIETY  Goal: Will report anxiety at manageable levels  Description: INTERVENTIONS:  - Administer medication as ordered  - Teach and rehearse alternative coping skills  - Provide emotional support with 1:1 interaction with staff  Outcome: Completed     Problem: Patient/Family Goals  Goal: Patient/Family Long Term Goal  Description: Patient's Long Term Goal: Safe delivery of baby and mom    Interventions:    - See additional Care Plan goals for specific interventions  Outcome: Completed  Goal: Patient/Family Short Term Goal  Description: Patient's Short Term Goal: Adequate pain control    Interventions:     - See additional Care Plan goals for specific interventions  Outcome: Completed

## 2025-02-09 NOTE — PROGRESS NOTES
Labor Analgesia Follow Up Note    Patient underwent epidural anesthesia for labor analgesia,    Placenta Date/Time: 2/8/2025  8:15 PM    Delivery Date/Time:: 2/8/2025  8:10 PM    BP (!) 135/93 (BP Location: Left arm)   Pulse 61   Temp 97.6 °F (36.4 °C) (Oral)   Resp 18   Wt 88.9 kg (196 lb)   LMP 05/11/2024 (Exact Date)   SpO2 93%   Breastfeeding Yes   BMI 31.65 kg/m²     Assessment:  Patient seen and knee numbness from this morning has resolved. Mild, non radiating back pain with movement.  no apparent anesthesia related complications.    Thank you for asking us to participate in the care of your patient.

## 2025-02-09 NOTE — PROGRESS NOTES
[facility]  Post-Partum Progress Note    Cady Garcia Patient Status:  Inpatient    1990 MRN [id number]   Location [facility] 1SW-J Attending Jeyson Yun MD   Hosp Day # 1 PCP Lucía Chun DO     SUBJECTIVE:    Postpartum Day 1:    The patient feels well. The patient denies emotional concerns. Pain is well controlled with current medications. The baby is well. The patient tolerating a normal diet.  Lochia is appropriate.    OBJECTIVE:    Vital signs in last 24 hours:  Temp:  [97.6 °F (36.4 °C)-98.6 °F (37 °C)] 97.6 °F (36.4 °C)  Pulse:  [] 64  Resp:  [16-20] 18  BP: ()/(22-92) 154/86  SpO2:  [90 %-99 %] 93 %  Input/Output:    Intake/Output Summary (Last 24 hours) at 2025 0828  Last data filed at 2025 0027  Gross per 24 hour   Intake 1592.9 ml   Output 1100 ml   Net 492.9 ml       General:    alert, appears stated age, and cooperative   Uterine Fundus:   firm, below the umbilicus   DVT Evaluation:  no evidence of DVT     Data Reviewed:  Recent Labs   Lab 25  1538 25  0917 25  0803   RBC 4.07 4.42 4.01   HGB 12.4 13.3 12.5   HCT 35.6 38.6 35.6   MCV 87.5 87.3 88.8   MCH 30.5 30.1 31.2   MCHC 34.8 34.5 35.1   RDW 13.3 13.2 13.5   NEPRELIM 3.59 3.61 8.39*   WBC 7.8 7.4 12.3*   .0 153.0 131.0*         ASSESSMENT/PLAN:    Status post Vaginal Delivery - doing well    Continue present management    Jeyson Yun MD  2025  8:28 AM

## 2025-02-09 NOTE — PROGRESS NOTES
Spoke with Dr. Yun regarding elevated blood pressures with symptoms. OK for RN to maintain PIV and perform more frequent blood pressure checks while patient is awake. RN to notify MD if patient's symptoms worsen. Will continue to monitor.

## 2025-02-10 VITALS
RESPIRATION RATE: 18 BRPM | OXYGEN SATURATION: 93 % | DIASTOLIC BLOOD PRESSURE: 78 MMHG | WEIGHT: 196 LBS | BODY MASS INDEX: 32 KG/M2 | HEART RATE: 69 BPM | TEMPERATURE: 98 F | SYSTOLIC BLOOD PRESSURE: 119 MMHG

## 2025-02-10 PROBLEM — Z34.90 PREGNANT (HCC): Status: RESOLVED | Noted: 2025-02-08 | Resolved: 2025-02-10

## 2025-02-10 PROBLEM — O13.3: Status: RESOLVED | Noted: 2025-02-07 | Resolved: 2025-02-10

## 2025-02-10 RX ORDER — LABETALOL 100 MG/1
100 TABLET, FILM COATED ORAL 2 TIMES DAILY
Qty: 60 TABLET | Refills: 1 | Status: SHIPPED | OUTPATIENT
Start: 2025-02-10

## 2025-02-10 NOTE — PROGRESS NOTES
[facility]  Post-Partum Progress Note    Cady Garcia Patient Status:  Inpatient    1990 MRN [id number]   Location [facility] 1SW-J Attending Jeyson Yun MD   Hosp Day # 2 PCP Lucía Chun DO     SUBJECTIVE:    Postpartum Day 2:    The patient feels well. The patient denies emotional concerns. Pain is well controlled with current medications. The baby is well. The patient tolerating a normal diet.  Lochia is appropriate. Minor headache well controlled by tylenol    OBJECTIVE:    Vital signs in last 24 hours:  Temp:  [97.4 °F (36.3 °C)-97.9 °F (36.6 °C)] 97.7 °F (36.5 °C)  Pulse:  [53-69] 69  Resp:  [18] 18  BP: (119-162)/(77-93) 119/78  Input/Output:  No intake or output data in the 24 hours ending 02/10/25 1217  Date/Time Temp Pulse Resp BP SpO2 Weight O2 Device O2 Flow Rate (L/min) [facility]   02/10/25 0721 97.7 °F (36.5 °C) 69 18 119/78 -- -- None (Room air) -- TV   02/10/25 0610 -- 68 -- 144/93 Abnormal  -- -- -- -- KB   25 2339 -- 66 -- 129/77 -- -- -- -- KB   25 97.9 °F (36.6 °C) 63 18 125/85 -- -- None (Room air) -- KB   25 1846 97.4 °F (36.3 °C) 56 -- 119/80 -- -- -- -- TM   25 1707 -- 53 -- 162/82 Abnormal  -- -- -- -- TM   25 1436 -- 61 -- 135/93 Abnormal  -- -- -- -- TM   25 1225 -- -- -- 158/92 Abnormal  -- -- -- -- TM   25 1036 -- 61 -- 154/84 -- -- -- -- TM   25 0902 -- -- -- 158/92 Abnormal  -- -- None (Room air) -- TM   25 0740 -- 57 -- 147/93 Abnormal  -- -- -- -- TM   25 0738 97.6 °F (36.4 °C) 64 18 154/86 -- -- None (Room air) -- TV   25 0303 -- 61 17 125/89 -- -- None (Room air) -- NT   25 2318 98.3 °F (36.8 °C) 75 18 141/88 -- -- None (Room air) -- NT   25 2245 -- 80 18 134/87 -- -- -- -- MP   25 -- 95 16 129/70 -- -- -- -- MP   25 -- 92 18 135/70 -- -- -- -- MP   25 -- 78 18 138/74 -- -- -- -- MP   25 -- 78 17 138/74 -- -- -- -- MP    02/08/25 2130 -- 86 19 136/69 -- -- -- -- MP   02/08/25 2115 -- 80 20 134/59 -- -- -- -- MP   02/08/25 2100 -- 87 18 127/78 -- -- -- -- MP   02/08/25 2045 -- 89 17 123/71 -- -- -- -- MP   02/08/25 2030 -- 66 20 142/87 -- -- -- -- MP   02/08/25 2015 -- 78 20 137/82 -- -- -- -- MP   02/08/25 2005 -- 85 -- -- 93 % -- -- -- MP   02/08/25 2000 -- 86 20 149/82 96 % -- -- -- MP   02/08/25 1945 -- 67 -- 80/65 Abnormal  97 % -- -- -- MP   02/08/25 1942 -- 71 -- -- -- -- -- -- MP   02/08/25 1941 -- 79 18 138/66 95 % -- -- -- MP   02/08/25 1930 -- 72 17 132/67 97 % -- -- -- MP   02/08/25 1925 -- 64 -- 131/66 98 % -- -- -- MP   02/08/25 1915 98.4 °F (36.9 °C) 56 19 125/67 98 % -- None (Room air) -- MP   02/08/25 1900 -- 60 -- 127/66 -- -- -- -- KM   02/08/25 1840 -- 67 -- 131/73 -- -- -- -- GM   02/08/25 1830 -- 60 -- 121/59 -- -- -- -- GM   02/08/25 1819 -- 74 -- 106/60 -- -- -- -- GM   02/08/25 1816 98 °F (36.7 °C) 64 18 111/66 -- -- -- -- GM   02/08/25 1812 -- 78 -- 102/79 -- -- -- -- GM   02/08/25 1810 -- 67 -- 44/22 Abnormal  90 % -- -- -- GM   02/08/25 1807 -- 71 -- -- 90 % -- -- -- GM   02/08/25 1805 -- 60 -- 120/55 99 % -- -- -- GM   02/08/25 1800 -- 50 -- 83/45 Abnormal  99 % -- -- -- GM   02/08/25 1755 -- 77 -- 71/56 Abnormal  99 % -- -- -- GM   02/08/25 1750 -- 68 -- 123/65 97 % -- -- -- GM   02/08/25 1745 -- 59 -- 109/64 97 % -- -- -- GM   02/08/25 1740 -- 73 -- 100/63 97 % -- -- -- GM   02/08/25 1734 -- 68 -- 120/70 97 % -- -- -- GM   02/08/25 1725 -- 101 -- 137/90 97 % -- -- -- GM   02/08/25 1655 -- 71 18 137/92 Abnormal  -- -- -- -- GM   02/08/25 1654 -- 88 -- -- 93 % -- -- -- GM   02/08/25 1406 98.3 °F (36.8 °C) 78 18 128/88 -- -- None (Room air) -- GM   02/08/25 1143 98.4 °F (36.9 °C) 51 -- 121/61 -- -- -- -- GM   02/08/25 1130 -- 54 -- 123/66 -- -- -- -- GM   02/08/25 1100 -- 53 -- 120/62 -- -- -- -- GM   02/08/25 1050 -- 55 -- 120/59 -- -- -- -- GM   02/08/25 1045 -- 53 -- 109/57 -- -- -- -- GM   02/08/25  1040 -- 49 Abnormal  -- 100/53 -- -- -- -- GM   02/08/25 1030 -- 48 Abnormal  -- 84/40 Abnormal  -- -- -- -- GM   02/08/25 0930 98.6 °F (37 °C) 80 18 134/83 -- 196 lb (88.9 kg) None (Room air) --      General:    alert, appears stated age, and cooperative   Uterine Fundus:   firm, below the umbilicus   DVT Evaluation:  no evidence of DVT     Data Reviewed:  Recent Labs   Lab 02/06/25  1538 02/08/25  0917 02/09/25  0803   RBC 4.07 4.42 4.01   HGB 12.4 13.3 12.5   HCT 35.6 38.6 35.6   MCV 87.5 87.3 88.8   MCH 30.5 30.1 31.2   MCHC 34.8 34.5 35.1   RDW 13.3 13.2 13.5   NEPRELIM 3.59 3.61 8.39*   WBC 7.8 7.4 12.3*   .0 153.0 131.0*     Collected 2/8/2025  9:17 AM       Status: Final result    0 Result Notes      Component  Ref Range & Units 2/8/25  9:17 AM   Total Protein Urine Random  <14.0 mg/dL 25.4 High    Creatinine Ur Random  mg/dL 206.90   Urine Protein/Creatinine Ratio, Random 0.12      Collected 2/8/2025  9:17 AM       Status: Final result    0 Result Notes      Component  Ref Range & Units 2/8/25  9:17 AM   Glucose  70 - 99 mg/dL 80   Sodium  136 - 145 mmol/L 137   Potassium  3.5 - 5.1 mmol/L 4.1   Chloride  98 - 112 mmol/L 104   CO2  21.0 - 32.0 mmol/L 21.0   Anion Gap  0 - 18 mmol/L 12   BUN  9 - 23 mg/dL 14   Creatinine  0.55 - 1.02 mg/dL 0.89   Calcium, Total  8.7 - 10.6 mg/dL 9.0   Calculated Osmolality  275 - 295 mOsm/kg 283   eGFR-Cr  >=60 mL/min/1.73m2 88   AST  <34 U/L 31   ALT  10 - 49 U/L 27   Alkaline Phosphatase  37 - 98 U/L 398 High    Bilirubin, Total  0.3 - 1.2 mg/dL 0.2 Low    Total Protein  5.7 - 8.2 g/dL 6.5   Albumin  3.2 - 4.8 g/dL 4.0   Globulin  2.0 - 3.5 g/dL 2.5   A/G Ratio  1.0 - 2.0 1.6          ASSESSMENT/PLAN:    Status post Vaginal Delivery - doing well  PIH well controlled  Home late today on Labetalol    Jeyson Yun MD  2/10/2025  12:17 PM

## 2025-02-10 NOTE — DISCHARGE INSTRUCTIONS
Post Vaginal Delivery Home Care Instructions       We hope you were pleased with your care with the [facility].  We wish you the best outcome and overall experience with the delivery of your baby.  These instructions will help to minimize pain, limit the risk for an infection, and improve the likelihood of a successful recovery.    What to Expect:  Abdominal cramping after delivery especially if you are breastfeeding.   Vaginal bleeding for about 4-6 weeks that may be followed by a yellow or white discharge for a few more weeks.  Your period will resume in approximately 6-8 weeks, unless you are breastfeeding.    If you are bottle feeding, you may notice breast engorgement in about 3 days.  Your breast may be sore and hard. Please wear a tight fitted bra or sports bra for 24-36 hours to help prevent your breast from producing milk, and use ice packs to relive any discomfort.  If you are breastfeeding, nipple dryness is very common the first few days.    Constipation is common after having a baby.  Please increase fluid and fiber in your diet.      Over-The-Counter Medication  Non-prescription anti-inflammatory medications can also help to ease the pain.  You may take Aleve, Tylenol or Ibuprofen   Colace or Metamucil for Constipation  Lanolin for dry nipples  Tucks, Witch Hazel and Epifoam for Episiotomy discomfort.   Drink a full glass of water with oral medication and take as directed.    Wound Care  The following instructions will promote proper healing and help to prevent infection  Episiotomy Care: Sitz Bath for 15mins, 2-3 times a day,    Bathing/Showers  You may resume showers  No baths, swimming, hot tubs until your post-partum visit    Home Medication  Resume your home medications as instructed    Diet  Resume your normal diet    Activity  Refrain from vaginal intercourse, vaginal suppositories, tampon use or douches until after your post-partum visit.  No exercising for 4 weeks  You may climb  stairs minimally for the 1st week.    Do not do heavy housework for at least 2-3 weeks    Return to Work or School  You may return to work in approximately 6 weeks  Contact your obstetrician’s office, if you need a medical release.     Driving  Avoid driving if you are taking narcotics for pain relief.    Follow-up Appointment with Your Obstetrician  Call your obstetrician’s office today for an appointment in six weeks.    Verify your appointment date, day, time, and location.  At your 1st post-partum office visit:  Your progress will be evaluated, findings reviewed, and any additional concerns and instructions will be discussed.    Questions or Concerns  Call your obstetrician’s office if you experience the following:  Severe pain not controlled by pain medication  Too much pain when touched; yellowish, greenish, or bloody discharge, foul smelling vaginal discharge, cut site opens up  Heavy bleeding,problems with pain that does not go away or gets worse  Shortness of breath or sudden onset of chest pain  Fever of 100.4 F (38 C) or higher, chills, warmth around wound that will not heal  Redness, increased swelling or drainage from your incision  Crying and periods of sadness that prevents you from caring for yourself and your baby or you feel like harming yourself or baby.  Burning sensation during urination or inability to urinate or have bowel movements  Swelling, redness or abnormal warmth to your leg/calf  Swollen, hard, or painful breasts  You are not feeling better in 2 to 3 days, or are feeling increasingly worse  If your call is made after office hours, a physician will be available to help you.  There is always a provider covering our patients.        Please see your Mother-Baby Instruction Pamphlet in your [facility] Folder for reference on instructions for Mother-Baby care.    Noreen Joe         Please check and log blood pressures twice daily.  If blood pressure is above 160/90, please  call office.  If blood pressure is below 100/70, please hold labetalol and call office.

## 2025-02-10 NOTE — PROGRESS NOTES
Discharge instructions reviewed with, and copy given to, patient.  Patient verbalized understanding of all instructions and denied further questions.  Pt discharged in stable condition, with infant and .

## 2025-02-10 NOTE — DISCHARGE SUMMARY
[facility]  Discharge Summary    Cady Garcia Patient Status:  Inpatient    1990 MRN [id number]   Location [facility] 1SW-J Attending Jeyson Yun MD   Hosp Day # 2 PCP Lucía Chun DO     Admit Date:  2025    EDC: Estimated Date of Delivery: 2/15/25    Gestational Age: 39w0d    Antepartum complications: See Prenatal Record    Date of Delivery: See Delivery Summary    Delivered By:  See Delivery Summary    Delivery Type: spontaneous vaginal delivery       Intrapartum Complications: See Delivery Summary    Episiotomy / lacerations: See Delivery Summary      Rh Immune Globulin Given:  See Prenatal Record and nursing notes    Rubella Vaccine Given: See Prenatal Record and nursing notes    Activity:  Routine post partum and post operative instructions if  section    Medications:  Motrin alternating with Tylenol, Labetalol 100 mg PO BID - wean as needed     Diet:  General    Discharge Date: 2/10/2025          Plan:       Follow-up appointment in 1  week  Routine post partum instructions    Jesyon Yun MD  2/10/2025  12:21 PM

## 2025-02-12 ENCOUNTER — TELEPHONE (OUTPATIENT)
Dept: OBGYN CLINIC | Facility: CLINIC | Age: 35
End: 2025-02-12

## 2025-02-12 ENCOUNTER — TELEPHONE (OUTPATIENT)
Dept: OBGYN UNIT | Facility: HOSPITAL | Age: 35
End: 2025-02-12

## 2025-02-12 NOTE — PROGRESS NOTES
Reviewed self and infant care w / mom, she verbalizes understanding of instructions reviewed. Encourage to follow up w/ MDs as directed and w/ questions/concerns. On bp med at home, taking bps, c/o feeling occas lightheaded or dizzy, bp 90s/60s, if so, enc to call OB office or my chart OB to see if she should hold the dose, repeat in office in 2 days, sx of PIH reviewed. Mainly pumping now, all reviewed and enc to call [facility] prn, enc to join ct, flyers sent. Has seen ped

## 2025-02-12 NOTE — TELEPHONE ENCOUNTER
Received a Release of Information via [employer] message for   [facility]/ [facility]/ [facility]

## 2025-02-12 NOTE — TELEPHONE ENCOUNTER
Received disability forms via email from office. Valid Release of Information. Logged for processing.

## 2025-02-13 ENCOUNTER — TELEPHONE (OUTPATIENT)
Facility: CLINIC | Age: 35
End: 2025-02-13

## 2025-02-13 NOTE — TELEPHONE ENCOUNTER
Patient called to reports blood pressure was normal, was wondering if she should stop labetalol. Patient informed to continue taking labetalol as directed and will discuss with Dr. Yun 2/14 if she should continue taking or stop as blood pressures are controlled with medication.

## 2025-02-14 ENCOUNTER — POSTPARTUM (OUTPATIENT)
Facility: CLINIC | Age: 35
End: 2025-02-14
Payer: COMMERCIAL

## 2025-02-14 VITALS
DIASTOLIC BLOOD PRESSURE: 82 MMHG | BODY MASS INDEX: 28.61 KG/M2 | SYSTOLIC BLOOD PRESSURE: 126 MMHG | HEIGHT: 65.98 IN | WEIGHT: 178 LBS

## 2025-02-14 PROCEDURE — 3079F DIAST BP 80-89 MM HG: CPT | Performed by: OBSTETRICS & GYNECOLOGY

## 2025-02-14 PROCEDURE — 3008F BODY MASS INDEX DOCD: CPT | Performed by: OBSTETRICS & GYNECOLOGY

## 2025-02-14 PROCEDURE — 3074F SYST BP LT 130 MM HG: CPT | Performed by: OBSTETRICS & GYNECOLOGY

## 2025-02-14 NOTE — PROGRESS NOTES
Post Partum    DELIVERY DATE:  6 days ago here for BP check TYPE:      Episiotomy/ laceration:  2nd    BABY:  alive and well  LACTATING:  yes     COMPLICATIONS:  see above    No preeclamptic symptoms     INFANT DATA - see delivery summary    Vitals:    25 1037   BP: 126/82       1 WEEKS Post Partum     SUBJECTIVE:  no complaints.  Adequate analgesics controlling incisional tenderness.    Breast  feeding without complaints.  Lochia normal    EXAM:   Abdomen:  Incision well approximated. Clean and dry.     Fundus:  Firm, contracted, non-tender at 14 week size    Pelvic:  Deferred          A:  s/p  doing well        PIH - BP's coming down  Plan:  Ok to start meaning off Labetalol    Fu 5 weeks PP      Jeyson Yun MD  2025

## 2025-02-18 NOTE — TELEPHONE ENCOUNTER
Dr. Yun,    *ACKNOWLEDGE BUTTON HAS BEEN MOVED TO THE TOP RIGHT RIBBON*    Please sign off on form if you agree to: Family Medical Leave Act for maternity     -Signature page will be the first page scanned  -From your Inbasket, Highlight the patient and click Chart   -Double click the 2/12/2025 Forms Completion telephone encounter  -Scroll down to the Media section   -Click the blue Hyperlink: Family Medical Leave Act Dr Yun 2/18/2025  -Click Acknowledge located in the top right ribbon/menu   -Drag the mouse into the blank space of the document and a + sign will appear. Left click to   electronically sign the document.  -Once signed, simply exit out of the screen and you signature will be saved.     Thank you,    Lavern

## 2025-02-19 NOTE — TELEPHONE ENCOUNTER
Forms completed and efaxed to [employer] fax # [phone] waiting on confirmation and sent copy to patient via [employer]. Efax successful.

## 2025-02-25 ENCOUNTER — TELEPHONE (OUTPATIENT)
Facility: CLINIC | Age: 35
End: 2025-02-25

## 2025-02-25 NOTE — TELEPHONE ENCOUNTER
Pt called stating that her last few b/p readings have been lower and she is doing well with the lowered dose of her b/p meds. She is now taking Labetalol 100 MG Oral Tab one per day every morning. Pt is asking is she still needs to take Labetalol given her last 3 reading are as reported below;    2/24/25 in am 114/72  2/24/25 in  pm 98/60    2/25/25 in pm 105/62    Per  Jaimee GONZALES pt can stop taking the medication as of tomorrow. Pt needs to continue to monitor her blood pressure twice a day and report the readings to to us for evaluation.Pt also needs to call us if there are any changes in readings or the way she feels.     Pt informed of all above instructions and she verbalized understanding.

## 2025-03-06 ENCOUNTER — PATIENT MESSAGE (OUTPATIENT)
Dept: FAMILY MEDICINE CLINIC | Facility: CLINIC | Age: 35
End: 2025-03-06

## 2025-03-06 DIAGNOSIS — Z71.89 ENCOUNTER FOR ANTEPARTUM CONSULTATION REGARDING LACTATION: Primary | ICD-10-CM

## 2025-03-06 DIAGNOSIS — Z00.00 WELL ADULT EXAM: Primary | ICD-10-CM

## 2025-03-10 ENCOUNTER — LAB ENCOUNTER (OUTPATIENT)
Dept: LAB | Age: 35
End: 2025-03-10
Attending: OBSTETRICS & GYNECOLOGY
Payer: COMMERCIAL

## 2025-03-10 ENCOUNTER — NURSE ONLY (OUTPATIENT)
Dept: LACTATION | Facility: HOSPITAL | Age: 35
End: 2025-03-10
Attending: OBSTETRICS & GYNECOLOGY
Payer: COMMERCIAL

## 2025-03-10 DIAGNOSIS — Z00.00 WELL ADULT EXAM: ICD-10-CM

## 2025-03-10 DIAGNOSIS — O92.4 HYPOGALACTIA WITH INFANT BREAST ATTACHMENT DIFFICULTY (HCC): ICD-10-CM

## 2025-03-10 LAB
ALBUMIN SERPL-MCNC: 4.6 G/DL (ref 3.2–4.8)
ALBUMIN/GLOB SERPL: 2.2 {RATIO} (ref 1–2)
ALP LIVER SERPL-CCNC: 91 U/L
ALT SERPL-CCNC: 29 U/L
ANION GAP SERPL CALC-SCNC: 10 MMOL/L (ref 0–18)
AST SERPL-CCNC: 27 U/L (ref ?–34)
BASOPHILS # BLD AUTO: 0.04 X10(3) UL (ref 0–0.2)
BASOPHILS NFR BLD AUTO: 0.6 %
BILIRUB SERPL-MCNC: 0.3 MG/DL (ref 0.3–1.2)
BUN BLD-MCNC: 12 MG/DL (ref 9–23)
CALCIUM BLD-MCNC: 9.4 MG/DL (ref 8.7–10.6)
CHLORIDE SERPL-SCNC: 103 MMOL/L (ref 98–112)
CHOLEST SERPL-MCNC: 238 MG/DL (ref ?–200)
CO2 SERPL-SCNC: 29 MMOL/L (ref 21–32)
CREAT BLD-MCNC: 0.86 MG/DL
EGFRCR SERPLBLD CKD-EPI 2021: 92 ML/MIN/1.73M2 (ref 60–?)
EOSINOPHIL # BLD AUTO: 0.07 X10(3) UL (ref 0–0.7)
EOSINOPHIL NFR BLD AUTO: 1.1 %
ERYTHROCYTE [DISTWIDTH] IN BLOOD BY AUTOMATED COUNT: 14.5 %
FASTING PATIENT LIPID ANSWER: YES
FASTING STATUS PATIENT QL REPORTED: YES
GLOBULIN PLAS-MCNC: 2.1 G/DL (ref 2–3.5)
GLUCOSE BLD-MCNC: 101 MG/DL (ref 70–99)
HCT VFR BLD AUTO: 37.7 %
HDLC SERPL-MCNC: 69 MG/DL (ref 40–59)
HGB BLD-MCNC: 12.6 G/DL
IMM GRANULOCYTES # BLD AUTO: 0.01 X10(3) UL (ref 0–1)
IMM GRANULOCYTES NFR BLD: 0.2 %
LDLC SERPL CALC-MCNC: 144 MG/DL (ref ?–100)
LYMPHOCYTES # BLD AUTO: 3.38 X10(3) UL (ref 1–4)
LYMPHOCYTES NFR BLD AUTO: 51.4 %
MCH RBC QN AUTO: 30.4 PG (ref 26–34)
MCHC RBC AUTO-ENTMCNC: 33.4 G/DL (ref 31–37)
MCV RBC AUTO: 91.1 FL
MONOCYTES # BLD AUTO: 0.47 X10(3) UL (ref 0.1–1)
MONOCYTES NFR BLD AUTO: 7.1 %
NEUTROPHILS # BLD AUTO: 2.61 X10 (3) UL (ref 1.5–7.7)
NEUTROPHILS # BLD AUTO: 2.61 X10(3) UL (ref 1.5–7.7)
NEUTROPHILS NFR BLD AUTO: 39.6 %
NONHDLC SERPL-MCNC: 169 MG/DL (ref ?–130)
OSMOLALITY SERPL CALC.SUM OF ELEC: 294 MOSM/KG (ref 275–295)
PLATELET # BLD AUTO: 169 10(3)UL (ref 150–450)
POTASSIUM SERPL-SCNC: 3.8 MMOL/L (ref 3.5–5.1)
PROT SERPL-MCNC: 6.7 G/DL (ref 5.7–8.2)
RBC # BLD AUTO: 4.14 X10(6)UL
SODIUM SERPL-SCNC: 142 MMOL/L (ref 136–145)
TRIGL SERPL-MCNC: 143 MG/DL (ref 30–149)
VLDLC SERPL CALC-MCNC: 27 MG/DL (ref 0–30)
WBC # BLD AUTO: 6.6 X10(3) UL (ref 4–11)

## 2025-03-10 PROCEDURE — 80053 COMPREHEN METABOLIC PANEL: CPT

## 2025-03-10 PROCEDURE — 85025 COMPLETE CBC W/AUTO DIFF WBC: CPT

## 2025-03-10 PROCEDURE — 36415 COLL VENOUS BLD VENIPUNCTURE: CPT

## 2025-03-10 PROCEDURE — 80061 LIPID PANEL: CPT

## 2025-03-10 PROCEDURE — 99213 OFFICE O/P EST LOW 20 MIN: CPT

## 2025-03-15 NOTE — PROGRESS NOTES
Note to patient: The 21st Century Cures Act makes medical notes like these available to patients in the interest of transparency. However, be advised this is a medical document. It is intended as peer to peer communication. It is written in medical language and may contain abbreviations or verbiage that are unfamiliar. It may appear blunt or direct. Medical documents are intended to carry relevant information, facts as evident, and the clinical opinion of the practitioner.         Chief Complaint   Patient presents with    Physical     Patient states she was not fasting for labs        HPI:    Patient presents for annual physical.  She is 6 weeks postpartum. She developed HTN during 2-3 weeks before delivery. She is off the labetolol two weeks ago.    Her past medical history includes anxiety, hyperlipidemia, hypothyroidism, and allergic rhinitis.     Current medications include lexapro. She is also seeing counselor.    She does have hx of HTN and GDM-diet controleld during pregnancy.     Labs:     Glucose 101  CBC wnl   She is taking her prenatals.     Anxiety: patient is on lexapro 5mg per day. Anxiety controlled. No SE.   Pregnancy induced HTN- pt was on labetolol 100mg daily . She is no longer on antihypertensive. BP well controlled.     Surgical history includes gastric bypass in 2022 and breast augmentation b/l in 2017.   Multiple nodules on neck ultrasound, benign, following with Endo     PSHx: Gastric bypass 2022- she is on MV, breast augmentation b/l 2017  FMHx:  -maternal: Mother- hypertension, hyperlipidemia   -paternal: Father - kidney disease,  prostate cancer; passed away from kidney failure 2 years   Smoking: none  Alcohol: none   Drugs: none    Occupation: Xray tech   No fmhx of breast cancer   Maternal uncle with colon cancer  Pap smear: Sees OB/GYN, had pap smear done 2/1/24- no hx of abnormal pap smears  Tdap: 2015    She is breastfeeding.     Wt Readings from Last 6 Encounters:   03/17/25  183 lb (83 kg)   25 178 lb (80.7 kg)   25 196 lb (88.9 kg)   25 196 lb (88.9 kg)   25 196 lb (88.9 kg)   25 196 lb (88.9 kg)     Review of Systems   Constitutional: Negative for fever, chills and fatigue. No distress.  HENT: Negative for hearing loss, congestion, sore throat  Eyes: Negative for pain and visual disturbance.   Respiratory: Negative for  chest tightness, shortness of breath and wheezing.    Cardiovascular: Negative for chest pain, palpitations and leg swelling.   Gastrointestinal: Negative for nausea, vomiting, abdominal pain, diarrhea, blood in stool and abdominal distention.   Genitourinary: Negative for dysuria, hematuria and difficulty urinating.   Musculoskeletal: Negative for myalgias, back pain      Past Medical History:    Allergic rhinitis    Anxiety    taking Venlafaxine 37.5mg    Gestational diabetes (HCC)    Hyperlipidemia    Hypothyroidism    Obesity    Pregnancy-induced hypertension (HCC)     Past Surgical History:   Procedure Laterality Date    Gastric bypass,obese<100cm nicole-en-y        25    Other surgical history  gastric bypass and breast augmentation     Family History   Problem Relation Age of Onset    Kidney Disease Father         kidney failure    Cancer Father         prostate CA    Diabetes Father     Heart Disorder Father         a-fib, and pacemaker    Hypertension Mother     Lipids Mother         high cholesterol    No Known Problems Brother      Social History     Socioeconomic History    Marital status:    Tobacco Use    Smoking status: Never     Passive exposure: Past    Smokeless tobacco: Never   Vaping Use    Vaping status: Never Used   Substance and Sexual Activity    Alcohol use: Yes     Alcohol/week: 1.0 standard drink of alcohol     Types: 1 Standard drinks or equivalent per week     Comment: rarely    Drug use: Never    Sexual activity: Yes     Partners: Male   Other Topics Concern    Caffeine Concern No    Exercise  No    Seat Belt No    Special Diet No    Stress Concern Yes     Comment: Anxiety    Weight Concern Yes     Comment: Gaining weight   Social History Narrative    ** Merged History Encounter **          Social Drivers of Health     Food Insecurity: No Food Insecurity (2/6/2025)    NCSS - Food Insecurity     Worried About Running Out of Food in the Last Year: No     Ran Out of Food in the Last Year: No   Transportation Needs: No Transportation Needs (2/6/2025)    NCSS - Transportation     Lack of Transportation: No   Stress: No Stress Concern Present (2/6/2025)    Stress     Feeling of Stress : No   Housing Stability: Not At Risk (2/6/2025)    NCSS - Housing/Utilities     Has Housing: Yes     Worried About Losing Housing: No     Unable to Get Utilities: No       Current Outpatient Medications   Medication Sig Dispense Refill    escitalopram 5 MG Oral Tab Take 1 tablet (5 mg total) by mouth daily. 90 tablet 0    Blood Glucose Monitoring Suppl (ONETOUCH VERIO FLEX SYSTEM) w/Device Does not apply Kit 1 each As Directed. 1 kit 0    OneTouch Delica Lancets 30G Does not apply Misc 1 each 4 (four) times daily. 200 each 3    prenatal vitamin with DHA 27-0.8-228 MG Oral Cap Take 1 capsule by mouth daily.      acetaminophen 500 MG Oral Tab Take 1 tablet (500 mg total) by mouth every 6 (six) hours as needed for Pain.         Allergies  Allergies   Allergen Reactions    Amoxicillin HIVES    Sulfa Antibiotics HIVES       Health Maintenance  Immunizations:  Immunization History   Administered Date(s) Administered    Covid-19 Vaccine Pfizer 30 mcg/0.3 ml 01/21/2021, 02/10/2021, 11/11/2021    FLUZONE 6 months and older PFS 0.5 ml (81725) 09/25/2017    HEP B, Adult 03/09/1993, 05/14/1993, 10/08/1993    Influenza 02/10/2020, 10/12/2021, 10/15/2023    Influenza Vaccine, trivalent (IIV3), PF 0.5mL (34696) 10/10/2024    MMR 03/10/1994, 06/18/1998    TDAP 07/06/2015, 12/31/2024    Tb Intradermal Test 07/02/2018         Physical Exam  BP  92/62   Pulse 61   Temp 97.3 °F (36.3 °C) (Temporal)   Resp 16   Ht 5' 6\" (1.676 m)   Wt 183 lb (83 kg)   LMP 05/11/2024 (Exact Date)   SpO2 99%   Breastfeeding Yes   BMI 29.54 kg/m²   Constitutional: Oriented to person, place, and time. No distress.   HEENT: Normocephalic and atraumatic. Hearing and tympanic membranes normal.  Nose normal. Oropharynx is clear and moist.   Eyes: Conjunctivae and EOM are normal. PERRLA. No scleral icterus.   Neck: No mass and no thyromegaly present.   Cardiovascular: Normal rate, regular rhythm and intact distal pulses.  No murmur, rubs or gallops.   Pulmonary/Chest: Effort normal and breath sounds normal. No respiratory distress. No wheezes, rhonchi or rales  Abdominal: Soft. Bowel sounds are normal. Non tender, no masses, no organomegaly or hernias.  Musculoskeletal: No edema and no tenderness.    Neurological: grossly normal   Skin: Skin is warm and dry.  Psychiatric: Normal mood and affect.     A/P:    Encounter Diagnoses   Name Primary?    Well adult exam Yes    CHON (generalized anxiety disorder)     Family history of diabetes mellitus     Multiple thyroid nodules     Other specified hypothyroidism       1. Well adult exam  - -Discussed diet and exercise, counseled on vaccine and screening guidelines.     2. CHON (generalized anxiety disorder)  Continue on lexapro 5mg daily  We discussed that lexapro can pass through breast milk. Notify if she notices irritability or lethargy or poor weight gain the baby.   Anxiety overall controlled.   Denies depression.   3. Family history of diabetes mellitus  Last A1c value was 5.1% done 12/6/2024.  Pt has hx of GDM during pregnancy - diet controlled.   Monitoring her sugars- once it was down to 66.   Advised on eating regular meals that are rich in protein and healthy fats.     4. Multiple thyroid nodules  F/u with Endo is scheduled.            Lucía Chun,         This note was prepared using Dragon Medical voice recognition  dictation software. As a result errors may occur. When identified these errors have been corrected. While every attempt is made to correct errors during dictation discrepancies may still exist.      Note to patient: The 21st Century Cures Act makes medical notes like these available to patients in the interest of transparency. However, be advised this is a medical document. It is intended as peer to peer communication. It is written in medical language and may contain abbreviations or verbiage that are unfamiliar. It may appear blunt or direct. Medical documents are intended to carry relevant information, facts as evident, and the clinical opinion of the practitioner.

## 2025-03-17 ENCOUNTER — OFFICE VISIT (OUTPATIENT)
Dept: FAMILY MEDICINE CLINIC | Facility: CLINIC | Age: 35
End: 2025-03-17
Payer: COMMERCIAL

## 2025-03-17 VITALS
BODY MASS INDEX: 29.41 KG/M2 | WEIGHT: 183 LBS | DIASTOLIC BLOOD PRESSURE: 62 MMHG | RESPIRATION RATE: 16 BRPM | HEIGHT: 66 IN | HEART RATE: 61 BPM | TEMPERATURE: 97 F | OXYGEN SATURATION: 99 % | SYSTOLIC BLOOD PRESSURE: 92 MMHG

## 2025-03-17 DIAGNOSIS — Z00.00 WELL ADULT EXAM: Primary | ICD-10-CM

## 2025-03-17 DIAGNOSIS — F41.1 GAD (GENERALIZED ANXIETY DISORDER): ICD-10-CM

## 2025-03-17 DIAGNOSIS — E04.2 MULTIPLE THYROID NODULES: ICD-10-CM

## 2025-03-17 DIAGNOSIS — Z83.3 FAMILY HISTORY OF DIABETES MELLITUS: ICD-10-CM

## 2025-03-24 ENCOUNTER — POSTPARTUM (OUTPATIENT)
Facility: CLINIC | Age: 35
End: 2025-03-24
Payer: COMMERCIAL

## 2025-03-24 VITALS — BODY MASS INDEX: 30 KG/M2 | DIASTOLIC BLOOD PRESSURE: 78 MMHG | SYSTOLIC BLOOD PRESSURE: 102 MMHG | WEIGHT: 184 LBS

## 2025-03-24 NOTE — PROGRESS NOTES
Post Partum    DELIVERY DATE:  25 TYPE:      Episiotomy/ laceration:  2nd degree    BABY:  alive and well  LACTATING:       EPDS:  Score  5    COMPLICATIONS:  PIH induction - sent home on Labetalol - discontinued recently    CONTRACEPTIVE METHOD DESIRED:      INFANT DATA - see delivery summary    Vitals:    25 1512   BP: 102/78     6 WEEKS:      SUBJECTIVE:  no complaints.  Adequate analgesics controlling incisional tenderness.       Breast feeding without complaints.  Lochia normal  EXAM:     Breast: no complaints or lactating so deferred.     Abdomen:  Soft non-tender, benign.  Uterus not palpable.  Incision clean and dry    healing well if present.   Pelvic:  EG:  Normal, Perineum well healed without breakdown. Non-tender at   laceration site.    Vagina:  Normal without discharge.  No significant hernias.      Cervix:  Multiparous, closed, no cervical motion tenderness    Uterus:  NSS  Small, contracted, nont-tender and mobile.  no prolapse    Adnexa:  Non-tender no masses   Rectal:  Deferred due to lack complaints.  no  External Hemorrhoids            A:  s/p  doing well    Plan:  Routine post partum counseling, contraception: declining pills  with back up advised.     Follow up for your annual exam in 10 months      Jeyson Yun MD  3/24/2025

## 2025-03-25 ENCOUNTER — MED REC SCAN ONLY (OUTPATIENT)
Facility: CLINIC | Age: 35
End: 2025-03-25

## 2025-03-31 ENCOUNTER — OFFICE VISIT (OUTPATIENT)
Facility: CLINIC | Age: 35
End: 2025-03-31
Payer: COMMERCIAL

## 2025-03-31 VITALS
DIASTOLIC BLOOD PRESSURE: 60 MMHG | BODY MASS INDEX: 29.09 KG/M2 | HEIGHT: 66 IN | OXYGEN SATURATION: 99 % | SYSTOLIC BLOOD PRESSURE: 112 MMHG | RESPIRATION RATE: 18 BRPM | WEIGHT: 181 LBS | HEART RATE: 69 BPM

## 2025-03-31 DIAGNOSIS — E04.2 MULTIPLE THYROID NODULES: Primary | ICD-10-CM

## 2025-03-31 PROCEDURE — 3078F DIAST BP <80 MM HG: CPT | Performed by: STUDENT IN AN ORGANIZED HEALTH CARE EDUCATION/TRAINING PROGRAM

## 2025-03-31 PROCEDURE — 3008F BODY MASS INDEX DOCD: CPT | Performed by: STUDENT IN AN ORGANIZED HEALTH CARE EDUCATION/TRAINING PROGRAM

## 2025-03-31 PROCEDURE — 3074F SYST BP LT 130 MM HG: CPT | Performed by: STUDENT IN AN ORGANIZED HEALTH CARE EDUCATION/TRAINING PROGRAM

## 2025-03-31 PROCEDURE — 99204 OFFICE O/P NEW MOD 45 MIN: CPT | Performed by: STUDENT IN AN ORGANIZED HEALTH CARE EDUCATION/TRAINING PROGRAM

## 2025-03-31 NOTE — PATIENT INSTRUCTIONS
Please have your thyroid ultrasound done at your convenience and we can compare it to 2023.     General follow up information:  Please let us know if you require any refills at least 1 week prior to your medication running out. If you do run out of medication, please call our office ASAP to request refills (do not wait until your follow up).  Please call us if you experience any problems with insurance coverage of medication, lab work, or imaging.   Lab results and imaging will typically be reviewed at follow up appointments, or within 3-5 business days of ALL results being in if you do not have an appointment scheduled in the near future. Our office will contact you for any abnormal results requiring more urgent follow up or action.   The on-call pager is for urgent matters only. If you are a type 1 diabetic and run out of insulin after business hours 8AM-4PM, you may call the on-call pager for a refill to a 24 hour pharmacy. If you have adrenal insufficiency and run out of steroids, you may call the on-call pager for a refill to a 24 hour pharmacy. All other refill requests should be requested during business hours.    Return Visit   [] Dr. Nunez in 2-3 months  [] Print orders for ultrasound, pt will have at [place]  [] Release of information for [facility] to fax previous thyroid ultrasound reports

## 2025-03-31 NOTE — H&P
Endocrinology Clinic Note    Name: Cady Garcia    Date: 3/31/2025       HISTORY OF PRESENT ILLNESS   Cady Garcia is a 34 year old female with PMHx significant for hyperlipidemia, reported hypothyroidism, anxiety, recent delivery of a baby in 2025, thyroid nodules who presents for initial endocrine consultation for thyroid nodule.  The patient reports that she was diagnosed with thyroid nodules after physical exam in , thyroid ultrasound was performed and apparently showed benign-appearing nodules which were not biopsied.  She was recommended to have subsequent ultrasound.  This was all done at [facility] and she does not have those records available with her at this time.  She denies any personal or physical family history of thyroid cancer.  She denies any personal history of medical radiation exposure, she does work as an x-ray tech.  She denies any compressive neck symptoms, dysphagia, dysphonia.  She has been historically euthyroid though chart mentions hypothyroidism, she is not on any manner of thyroid hormone replacement.  No acute complaints at this time.  Further data as below.      PAST MEDICAL HISTORY:   Past Medical History:    Allergic rhinitis    Anxiety    taking Venlafaxine 37.5mg    Gestational diabetes (HCC)    Hyperlipidemia    Hypothyroidism    Obesity    Pregnancy-induced hypertension (HCC)       PAST SURGICAL HISTORY:   Past Surgical History:   Procedure Laterality Date    Gastric bypass,obese<100cm nicole-en-y        27    Other surgical history  gastric bypass and breast augmentation       CURRENT MEDICATIONS:    Current Outpatient Medications   Medication Sig Dispense Refill    escitalopram 5 MG Oral Tab Take 1 tablet (5 mg total) by mouth daily. 90 tablet 0    prenatal vitamin with DHA 27-0.8-228 MG Oral Cap Take 1 capsule by mouth daily.      acetaminophen 500 MG Oral Tab Take 1 tablet (500 mg total) by mouth every 6 (six) hours as needed  for Pain.           ALLERGIES:  Allergies[1]    SOCIAL HISTORY:    Social History     Socioeconomic History    Marital status:    Tobacco Use    Smoking status: Never     Passive exposure: Past    Smokeless tobacco: Never   Vaping Use    Vaping status: Never Used   Substance and Sexual Activity    Alcohol use: Yes     Alcohol/week: 1.0 standard drink of alcohol     Types: 1 Standard drinks or equivalent per week     Comment: rarely    Drug use: Never    Sexual activity: Yes     Partners: Male   Other Topics Concern    Caffeine Concern No    Exercise No    Seat Belt No    Special Diet No    Stress Concern Yes     Comment: Anxiety    Weight Concern Yes     Comment: Gaining weight       FAMILY HISTORY:   Family History   Problem Relation Age of Onset    Kidney Disease Father         kidney failure    Cancer Father         prostate CA    Diabetes Father     Heart Disorder Father         a-fib, and pacemaker    Hypertension Mother     Lipids Mother         high cholesterol    No Known Problems Brother          REVIEW OF SYSTEMS:  Ten point review of systems has been performed and is otherwise negative and/or non-contributory, except as described above.      PHYSICAL EXAM:   Vitals:    03/31/25 0914   BP: 112/60   Pulse: 69   Resp: 18   SpO2: 99%   Weight: 181 lb (82.1 kg)   Height: 5' 6\" (1.676 m)     BMI: Body mass index is 29.21 kg/m².     CONSTITUTIONAL:  awake, alert, cooperative, no apparent distress, and appears stated age  PSYCH: normal affect  EYES:  No proptosis,  conjunctiva normal  ENT:  Normocephalic, atraumatic  NECK:  Supple, symmetrical, thyroid not enlarged and no tenderness.  Minimal enlargement.  LUNGS: breathing comfortably  CARDIOVASCULAR:  regular rate   ABDOMEN:  soft  SKIN:  no rashes and no lesions  EXTREMITIES: no edema      DATA:     Pertinent data reviewed 3/31/2025.    Previous ultrasound from [facility] not available through [facility].    ASSESSMENT AND PLAN:    #Multiple thyroid  nodules  -Reported previously identified thyroid nodules done in 2023, ultrasound done at [facility].  These were apparently benign appearing and were not biopsied.  Surveillance was recommended.  -Will request records from [facility] to review previous thyroid ultrasound reports  -Repeat thyroid ultrasound now, physical order given to patient can have the imaging done at [place] with results faxed to me for review  -Pending any evolution, will consider FNA as appropriate  -Patient has historically been euthyroid, recommend annual thyroid function screening with PCP  -Patient advised to monitor for any compressive neck symptoms  -Pathophysiology of thyroid nodules reviewed in detail with the patient  -All questions answered in detail    #?Hypothyroidism  -This is noted in chart but pt without documented TFTs showing hypothyroidism and is not on thyroid hormone replacement  -Will review records as above    The above plan was discussed in detail with the patient who verbalized understanding and agreement.      Luci Nunez DO  [facility] Endocrinology  3/31/2025     Note to patient: The 21 Century Cures Act makes medical notes like these available to patients in the interest of transparency. However, be advised this is a medical document. It is intended as peer to peer communication. It is written in medical language and may contain abbreviations or verbiage that are unfamiliar. It may appear blunt or direct. Medical documents are intended to carry relevant information, facts as evident, and the clinical opinion of the practitioner.      In reviewing this note, please be advised that Dragon Voice Recognition software used to dictate the note may have made errors in recognizing some of the words or phrases.          [1]   Allergies  Allergen Reactions    Amoxicillin HIVES    Sulfa Antibiotics HIVES

## 2025-04-21 DIAGNOSIS — F41.1 GAD (GENERALIZED ANXIETY DISORDER): ICD-10-CM

## 2025-04-22 RX ORDER — ESCITALOPRAM OXALATE 5 MG/1
5 TABLET ORAL DAILY
Qty: 90 TABLET | Refills: 3 | Status: SHIPPED | OUTPATIENT
Start: 2025-04-22

## 2025-04-22 NOTE — TELEPHONE ENCOUNTER
Requested Prescriptions     Pending Prescriptions Disp Refills    ESCITALOPRAM 5 MG Oral Tab [Pharmacy Med Name: ESCITALOPRAM 5MG TABLETS] 90 tablet 0     Sig: TAKE 1 TABLET(5 MG) BY MOUTH DAILY     LOV: 3/14/25  RTC:   Last Relevant Labs: 3/10/25  Filled: 1/3/25 #90 with 0 refills    Future Appointments   Date Time Provider Department Center   4/23/2025  9:15 AM [address]   6/13/2025  3:30 PM Luci Nunez DO [id number] [place]   1/30/2026  9:00 AM Jeyson Yun MD [facility]  [facility] [place]

## 2025-04-23 ENCOUNTER — HOSPITAL ENCOUNTER (OUTPATIENT)
Dept: ULTRASOUND IMAGING | Age: 35
Discharge: HOME | End: 2025-04-23
Attending: STUDENT IN AN ORGANIZED HEALTH CARE EDUCATION/TRAINING PROGRAM
Payer: COMMERCIAL

## 2025-04-23 DIAGNOSIS — E04.2 MULTIPLE THYROID NODULES: ICD-10-CM

## 2025-04-23 PROCEDURE — 76536 US EXAM OF HEAD AND NECK: CPT | Performed by: STUDENT IN AN ORGANIZED HEALTH CARE EDUCATION/TRAINING PROGRAM

## 2025-05-11 ENCOUNTER — PATIENT MESSAGE (OUTPATIENT)
Dept: FAMILY MEDICINE CLINIC | Facility: CLINIC | Age: 35
End: 2025-05-11

## 2025-05-11 DIAGNOSIS — Z88.9 H/O SEASONAL ALLERGIES: Primary | ICD-10-CM

## 2025-05-14 NOTE — TELEPHONE ENCOUNTER
An online digital E/M service was performed. Total of 5-10 minutes were spent obtaining and or reviewing additional medical history, documenting clinical information in the electronic health record via [employer], independently interpreting results, and/or coordinating care.